# Patient Record
Sex: MALE | Race: WHITE | Employment: OTHER | URBAN - METROPOLITAN AREA
[De-identification: names, ages, dates, MRNs, and addresses within clinical notes are randomized per-mention and may not be internally consistent; named-entity substitution may affect disease eponyms.]

---

## 2017-01-01 ENCOUNTER — HOSPITAL ENCOUNTER (OUTPATIENT)
Dept: NUCLEAR MEDICINE | Facility: HOSPITAL | Age: 72
Discharge: HOME | End: 2017-01-01
Attending: INTERNAL MEDICINE
Payer: MEDICARE

## 2017-01-01 ENCOUNTER — APPOINTMENT (OUTPATIENT)
Dept: CT IMAGING | Facility: HOSPITAL | Age: 72
DRG: 180 | End: 2017-01-01
Attending: EMERGENCY MEDICINE
Payer: MEDICARE

## 2017-01-01 ENCOUNTER — SURGERY (OUTPATIENT)
Age: 72
End: 2017-01-01

## 2017-01-01 ENCOUNTER — OFFICE VISIT (OUTPATIENT)
Dept: HEMATOLOGY/ONCOLOGY | Facility: HOSPITAL | Age: 72
End: 2017-01-01
Attending: PHYSICIAN ASSISTANT
Payer: MEDICARE

## 2017-01-01 ENCOUNTER — TELEPHONE (OUTPATIENT)
Dept: HEMATOLOGY/ONCOLOGY | Facility: HOSPITAL | Age: 72
End: 2017-01-01

## 2017-01-01 ENCOUNTER — HOSPITAL ENCOUNTER (INPATIENT)
Facility: HOSPITAL | Age: 72
LOS: 5 days | Discharge: HOME | DRG: 194 | End: 2017-01-01
Attending: EMERGENCY MEDICINE | Admitting: INTERNAL MEDICINE
Payer: MEDICARE

## 2017-01-01 ENCOUNTER — HOSPITAL ENCOUNTER (INPATIENT)
Facility: HOSPITAL | Age: 72
LOS: 1 days | DRG: 180 | End: 2017-01-01
Attending: INTERNAL MEDICINE | Admitting: INTERNAL MEDICINE
Payer: OTHER MISCELLANEOUS

## 2017-01-01 ENCOUNTER — APPOINTMENT (OUTPATIENT)
Dept: GENERAL RADIOLOGY | Facility: HOSPITAL | Age: 72
DRG: 180 | End: 2017-01-01
Attending: EMERGENCY MEDICINE
Payer: MEDICARE

## 2017-01-01 ENCOUNTER — LAB REQUISITION (OUTPATIENT)
Dept: LAB | Facility: HOSPITAL | Age: 72
End: 2017-01-01
Payer: MEDICARE

## 2017-01-01 ENCOUNTER — APPOINTMENT (OUTPATIENT)
Dept: GENERAL RADIOLOGY | Facility: HOSPITAL | Age: 72
DRG: 194 | End: 2017-01-01
Attending: HOSPITALIST
Payer: MEDICARE

## 2017-01-01 ENCOUNTER — OFFICE VISIT (OUTPATIENT)
Dept: HEMATOLOGY/ONCOLOGY | Facility: HOSPITAL | Age: 72
End: 2017-01-01
Attending: INTERNAL MEDICINE
Payer: MEDICARE

## 2017-01-01 ENCOUNTER — OFFICE VISIT (OUTPATIENT)
Dept: CARDIOLOGY CLINIC | Facility: HOSPITAL | Age: 72
End: 2017-01-01
Attending: INTERNAL MEDICINE
Payer: MEDICARE

## 2017-01-01 ENCOUNTER — TELEPHONE (OUTPATIENT)
Dept: MEDSURG UNIT | Facility: HOSPITAL | Age: 72
End: 2017-01-01

## 2017-01-01 ENCOUNTER — HOSPITAL ENCOUNTER (OUTPATIENT)
Age: 72
Discharge: HOME | End: 2017-01-01
Attending: EMERGENCY MEDICINE
Payer: MEDICARE

## 2017-01-01 ENCOUNTER — HOSPITAL ENCOUNTER (OUTPATIENT)
Dept: INTERVENTIONAL RADIOLOGY/VASCULAR | Facility: HOSPITAL | Age: 72
Discharge: HOME | End: 2017-01-01
Attending: INTERNAL MEDICINE | Admitting: INTERNAL MEDICINE
Payer: MEDICARE

## 2017-01-01 ENCOUNTER — PRIOR ORIGINAL RECORDS (OUTPATIENT)
Dept: OTHER | Age: 72
End: 2017-01-01

## 2017-01-01 ENCOUNTER — NURSE NAVIGATOR ENCOUNTER (OUTPATIENT)
Dept: HEMATOLOGY/ONCOLOGY | Facility: HOSPITAL | Age: 72
End: 2017-01-01

## 2017-01-01 ENCOUNTER — TELEPHONE (OUTPATIENT)
Dept: PALLIATIVE CARE | Facility: HOSPITAL | Age: 72
End: 2017-01-01

## 2017-01-01 ENCOUNTER — HOSPITAL ENCOUNTER (INPATIENT)
Facility: HOSPITAL | Age: 72
LOS: 8 days | Discharge: HOSPICE | DRG: 180 | End: 2017-01-01
Attending: EMERGENCY MEDICINE | Admitting: INTERNAL MEDICINE
Payer: MEDICARE

## 2017-01-01 ENCOUNTER — APPOINTMENT (OUTPATIENT)
Dept: GENERAL RADIOLOGY | Facility: HOSPITAL | Age: 72
DRG: 853 | End: 2017-01-01
Attending: INTERNAL MEDICINE
Payer: MEDICARE

## 2017-01-01 ENCOUNTER — APPOINTMENT (OUTPATIENT)
Dept: GENERAL RADIOLOGY | Facility: HOSPITAL | Age: 72
DRG: 180 | End: 2017-01-01
Attending: INTERNAL MEDICINE
Payer: MEDICARE

## 2017-01-01 ENCOUNTER — APPOINTMENT (OUTPATIENT)
Dept: INTERVENTIONAL RADIOLOGY/VASCULAR | Facility: HOSPITAL | Age: 72
DRG: 180 | End: 2017-01-01
Attending: INTERNAL MEDICINE
Payer: MEDICARE

## 2017-01-01 ENCOUNTER — APPOINTMENT (OUTPATIENT)
Dept: ULTRASOUND IMAGING | Facility: HOSPITAL | Age: 72
DRG: 180 | End: 2017-01-01
Attending: INTERNAL MEDICINE
Payer: MEDICARE

## 2017-01-01 ENCOUNTER — HOSPITAL ENCOUNTER (INPATIENT)
Facility: HOSPITAL | Age: 72
LOS: 6 days | Discharge: HOME HEALTH | DRG: 180 | End: 2017-01-01
Attending: EMERGENCY MEDICINE | Admitting: INTERNAL MEDICINE
Payer: MEDICARE

## 2017-01-01 ENCOUNTER — HOSPITAL ENCOUNTER (INPATIENT)
Facility: HOSPITAL | Age: 72
LOS: 16 days | Discharge: HOME HEALTH | DRG: 853 | End: 2017-01-01
Attending: EMERGENCY MEDICINE | Admitting: INTERNAL MEDICINE
Payer: MEDICARE

## 2017-01-01 ENCOUNTER — APPOINTMENT (OUTPATIENT)
Dept: MRI IMAGING | Facility: HOSPITAL | Age: 72
DRG: 180 | End: 2017-01-01
Attending: INTERNAL MEDICINE
Payer: MEDICARE

## 2017-01-01 ENCOUNTER — APPOINTMENT (OUTPATIENT)
Dept: CT IMAGING | Facility: HOSPITAL | Age: 72
DRG: 853 | End: 2017-01-01
Attending: INTERNAL MEDICINE
Payer: MEDICARE

## 2017-01-01 ENCOUNTER — TELEPHONE (OUTPATIENT)
Dept: CARDIOLOGY UNIT | Facility: HOSPITAL | Age: 72
End: 2017-01-01

## 2017-01-01 ENCOUNTER — HOSPITAL ENCOUNTER (OUTPATIENT)
Age: 72
Discharge: ANOTHER HOSPITAL | DRG: 180 | End: 2017-01-01
Attending: EMERGENCY MEDICINE
Payer: MEDICARE

## 2017-01-01 ENCOUNTER — SOCIAL WORK SERVICES (OUTPATIENT)
Dept: HEMATOLOGY/ONCOLOGY | Facility: HOSPITAL | Age: 72
End: 2017-01-01

## 2017-01-01 ENCOUNTER — HOSPITAL ENCOUNTER (OUTPATIENT)
Dept: GENERAL RADIOLOGY | Facility: HOSPITAL | Age: 72
Discharge: HOME | End: 2017-01-01
Attending: INTERNAL MEDICINE
Payer: MEDICARE

## 2017-01-01 ENCOUNTER — APPOINTMENT (OUTPATIENT)
Dept: GENERAL RADIOLOGY | Age: 72
DRG: 180 | End: 2017-01-01
Attending: EMERGENCY MEDICINE
Payer: MEDICARE

## 2017-01-01 ENCOUNTER — APPOINTMENT (OUTPATIENT)
Dept: CV DIAGNOSTICS | Facility: HOSPITAL | Age: 72
DRG: 194 | End: 2017-01-01
Attending: INTERNAL MEDICINE
Payer: MEDICARE

## 2017-01-01 ENCOUNTER — APPOINTMENT (OUTPATIENT)
Dept: CT IMAGING | Facility: HOSPITAL | Age: 72
DRG: 180 | End: 2017-01-01
Attending: INTERNAL MEDICINE
Payer: MEDICARE

## 2017-01-01 ENCOUNTER — APPOINTMENT (OUTPATIENT)
Dept: GENERAL RADIOLOGY | Facility: HOSPITAL | Age: 72
DRG: 853 | End: 2017-01-01
Attending: EMERGENCY MEDICINE
Payer: MEDICARE

## 2017-01-01 ENCOUNTER — APPOINTMENT (OUTPATIENT)
Dept: GENERAL RADIOLOGY | Facility: HOSPITAL | Age: 72
DRG: 180 | End: 2017-01-01
Attending: CLINICAL NURSE SPECIALIST
Payer: MEDICARE

## 2017-01-01 ENCOUNTER — APPOINTMENT (OUTPATIENT)
Dept: CV DIAGNOSTICS | Facility: HOSPITAL | Age: 72
DRG: 180 | End: 2017-01-01
Attending: INTERNAL MEDICINE
Payer: MEDICARE

## 2017-01-01 ENCOUNTER — APPOINTMENT (OUTPATIENT)
Dept: GENERAL RADIOLOGY | Facility: HOSPITAL | Age: 72
DRG: 194 | End: 2017-01-01
Payer: MEDICARE

## 2017-01-01 VITALS
HEART RATE: 47 BPM | RESPIRATION RATE: 32 BRPM | DIASTOLIC BLOOD PRESSURE: 45 MMHG | SYSTOLIC BLOOD PRESSURE: 77 MMHG | OXYGEN SATURATION: 90 %

## 2017-01-01 VITALS
TEMPERATURE: 98 F | WEIGHT: 164 LBS | DIASTOLIC BLOOD PRESSURE: 65 MMHG | BODY MASS INDEX: 22.21 KG/M2 | HEART RATE: 91 BPM | HEIGHT: 72 IN | SYSTOLIC BLOOD PRESSURE: 112 MMHG | RESPIRATION RATE: 18 BRPM

## 2017-01-01 VITALS
HEART RATE: 84 BPM | DIASTOLIC BLOOD PRESSURE: 67 MMHG | SYSTOLIC BLOOD PRESSURE: 104 MMHG | RESPIRATION RATE: 18 BRPM | TEMPERATURE: 98 F

## 2017-01-01 VITALS
HEIGHT: 72 IN | TEMPERATURE: 98 F | WEIGHT: 154.38 LBS | RESPIRATION RATE: 18 BRPM | SYSTOLIC BLOOD PRESSURE: 100 MMHG | DIASTOLIC BLOOD PRESSURE: 69 MMHG | BODY MASS INDEX: 20.91 KG/M2 | HEART RATE: 94 BPM

## 2017-01-01 VITALS
HEIGHT: 72 IN | TEMPERATURE: 98 F | SYSTOLIC BLOOD PRESSURE: 116 MMHG | HEART RATE: 79 BPM | OXYGEN SATURATION: 94 % | DIASTOLIC BLOOD PRESSURE: 68 MMHG | BODY MASS INDEX: 22.58 KG/M2 | WEIGHT: 166.69 LBS | RESPIRATION RATE: 18 BRPM

## 2017-01-01 VITALS
HEIGHT: 71 IN | WEIGHT: 165 LBS | OXYGEN SATURATION: 93 % | RESPIRATION RATE: 19 BRPM | BODY MASS INDEX: 23.1 KG/M2 | SYSTOLIC BLOOD PRESSURE: 90 MMHG | HEART RATE: 84 BPM | DIASTOLIC BLOOD PRESSURE: 57 MMHG

## 2017-01-01 VITALS
HEART RATE: 91 BPM | OXYGEN SATURATION: 90 % | TEMPERATURE: 99 F | WEIGHT: 155.5 LBS | DIASTOLIC BLOOD PRESSURE: 77 MMHG | SYSTOLIC BLOOD PRESSURE: 127 MMHG | BODY MASS INDEX: 21 KG/M2

## 2017-01-01 VITALS
BODY MASS INDEX: 21.4 KG/M2 | SYSTOLIC BLOOD PRESSURE: 102 MMHG | TEMPERATURE: 98 F | HEART RATE: 75 BPM | RESPIRATION RATE: 18 BRPM | HEIGHT: 72 IN | WEIGHT: 158 LBS | DIASTOLIC BLOOD PRESSURE: 59 MMHG

## 2017-01-01 VITALS
WEIGHT: 151 LBS | BODY MASS INDEX: 21.14 KG/M2 | HEIGHT: 71 IN | RESPIRATION RATE: 22 BRPM | HEART RATE: 58 BPM | SYSTOLIC BLOOD PRESSURE: 94 MMHG | DIASTOLIC BLOOD PRESSURE: 54 MMHG | OXYGEN SATURATION: 91 % | TEMPERATURE: 98 F

## 2017-01-01 VITALS
HEART RATE: 77 BPM | SYSTOLIC BLOOD PRESSURE: 116 MMHG | HEIGHT: 71 IN | RESPIRATION RATE: 16 BRPM | BODY MASS INDEX: 25.2 KG/M2 | OXYGEN SATURATION: 96 % | DIASTOLIC BLOOD PRESSURE: 76 MMHG | WEIGHT: 180 LBS | TEMPERATURE: 98 F

## 2017-01-01 VITALS
WEIGHT: 139.38 LBS | HEIGHT: 71 IN | SYSTOLIC BLOOD PRESSURE: 100 MMHG | TEMPERATURE: 98 F | BODY MASS INDEX: 19.51 KG/M2 | RESPIRATION RATE: 16 BRPM | HEART RATE: 99 BPM | OXYGEN SATURATION: 96 % | DIASTOLIC BLOOD PRESSURE: 68 MMHG

## 2017-01-01 VITALS
HEART RATE: 84 BPM | WEIGHT: 155 LBS | DIASTOLIC BLOOD PRESSURE: 58 MMHG | SYSTOLIC BLOOD PRESSURE: 106 MMHG | BODY MASS INDEX: 21 KG/M2 | OXYGEN SATURATION: 91 %

## 2017-01-01 VITALS
HEIGHT: 72 IN | SYSTOLIC BLOOD PRESSURE: 111 MMHG | RESPIRATION RATE: 16 BRPM | WEIGHT: 175 LBS | DIASTOLIC BLOOD PRESSURE: 69 MMHG | OXYGEN SATURATION: 94 % | TEMPERATURE: 98 F | HEART RATE: 98 BPM | BODY MASS INDEX: 23.7 KG/M2

## 2017-01-01 VITALS
RESPIRATION RATE: 18 BRPM | HEART RATE: 98 BPM | DIASTOLIC BLOOD PRESSURE: 59 MMHG | SYSTOLIC BLOOD PRESSURE: 104 MMHG | TEMPERATURE: 98 F

## 2017-01-01 VITALS
WEIGHT: 163.5 LBS | DIASTOLIC BLOOD PRESSURE: 73 MMHG | TEMPERATURE: 99 F | HEART RATE: 73 BPM | OXYGEN SATURATION: 95 % | RESPIRATION RATE: 24 BRPM | BODY MASS INDEX: 22.14 KG/M2 | HEIGHT: 72 IN | SYSTOLIC BLOOD PRESSURE: 120 MMHG

## 2017-01-01 VITALS
SYSTOLIC BLOOD PRESSURE: 116 MMHG | DIASTOLIC BLOOD PRESSURE: 68 MMHG | HEART RATE: 85 BPM | RESPIRATION RATE: 18 BRPM | TEMPERATURE: 99 F

## 2017-01-01 DIAGNOSIS — C79.51 BONE METASTASIS (HCC): Primary | ICD-10-CM

## 2017-01-01 DIAGNOSIS — J90 PLEURAL EFFUSION: ICD-10-CM

## 2017-01-01 DIAGNOSIS — J91.0 MALIGNANT PLEURAL EFFUSION: ICD-10-CM

## 2017-01-01 DIAGNOSIS — M79.18 INTERCOSTAL MUSCLE PAIN: Primary | ICD-10-CM

## 2017-01-01 DIAGNOSIS — C34.90 NON-SMALL CELL LUNG CANCER, UNSPECIFIED LATERALITY (HCC): ICD-10-CM

## 2017-01-01 DIAGNOSIS — C34.90 NON-SMALL CELL LUNG CANCER, UNSPECIFIED LATERALITY (HCC): Primary | ICD-10-CM

## 2017-01-01 DIAGNOSIS — R06.00 DYSPNEA, UNSPECIFIED TYPE: Primary | ICD-10-CM

## 2017-01-01 DIAGNOSIS — J18.9 COMMUNITY ACQUIRED PNEUMONIA, UNSPECIFIED LATERALITY: ICD-10-CM

## 2017-01-01 DIAGNOSIS — C34.92 NON-SMALL CELL CANCER OF LEFT LUNG (HCC): ICD-10-CM

## 2017-01-01 DIAGNOSIS — K59.09 OTHER CONSTIPATION: ICD-10-CM

## 2017-01-01 DIAGNOSIS — Z79.899 ON AMIODARONE THERAPY: ICD-10-CM

## 2017-01-01 DIAGNOSIS — Z85.21 HISTORY OF LARYNGEAL CANCER: ICD-10-CM

## 2017-01-01 DIAGNOSIS — R09.02 HYPOXIA: ICD-10-CM

## 2017-01-01 DIAGNOSIS — J18.9 COMMUNITY ACQUIRED PNEUMONIA OF LEFT LUNG, UNSPECIFIED PART OF LUNG: ICD-10-CM

## 2017-01-01 DIAGNOSIS — R11.0 NAUSEA: ICD-10-CM

## 2017-01-01 DIAGNOSIS — I48.0 PAROXYSMAL ATRIAL FIBRILLATION (HCC): ICD-10-CM

## 2017-01-01 DIAGNOSIS — F40.240 CLAUSTROPHOBIA: ICD-10-CM

## 2017-01-01 DIAGNOSIS — J44.1 COPD EXACERBATION (HCC): ICD-10-CM

## 2017-01-01 DIAGNOSIS — F32.89 OTHER DEPRESSION: ICD-10-CM

## 2017-01-01 DIAGNOSIS — I48.0 PAF (PAROXYSMAL ATRIAL FIBRILLATION) (HCC): ICD-10-CM

## 2017-01-01 DIAGNOSIS — C79.51 BONE METASTASIS (HCC): ICD-10-CM

## 2017-01-01 DIAGNOSIS — R91.8 LUNG MASS: ICD-10-CM

## 2017-01-01 DIAGNOSIS — J93.9 PNEUMOTHORAX, UNSPECIFIED TYPE: ICD-10-CM

## 2017-01-01 DIAGNOSIS — R79.89 ABNORMAL TSH: ICD-10-CM

## 2017-01-01 DIAGNOSIS — C34.90 LUNG CANCER (HCC): ICD-10-CM

## 2017-01-01 DIAGNOSIS — R63.4 WEIGHT LOSS: ICD-10-CM

## 2017-01-01 DIAGNOSIS — I48.92 ATRIAL FLUTTER WITH RAPID VENTRICULAR RESPONSE (HCC): ICD-10-CM

## 2017-01-01 DIAGNOSIS — A41.9 SEPSIS, DUE TO UNSPECIFIED ORGANISM: ICD-10-CM

## 2017-01-01 DIAGNOSIS — I48.91 ATRIAL FIBRILLATION (HCC): Primary | ICD-10-CM

## 2017-01-01 DIAGNOSIS — A41.9 SEPSIS, DUE TO UNSPECIFIED ORGANISM: Primary | ICD-10-CM

## 2017-01-01 DIAGNOSIS — R06.00 DYSPNEA, UNSPECIFIED TYPE: ICD-10-CM

## 2017-01-01 DIAGNOSIS — J90 PLEURAL EFFUSION: Primary | ICD-10-CM

## 2017-01-01 DIAGNOSIS — Z85.22 HISTORY OF CANCER OF NASAL CAVITY: ICD-10-CM

## 2017-01-01 DIAGNOSIS — J90 PLEURAL EFFUSION, LEFT: Primary | ICD-10-CM

## 2017-01-01 DIAGNOSIS — D72.829 LEUKOCYTOSIS, UNSPECIFIED TYPE: ICD-10-CM

## 2017-01-01 DIAGNOSIS — Z01.89 ENCOUNTER FOR OTHER SPECIFIED SPECIAL EXAMINATIONS: ICD-10-CM

## 2017-01-01 DIAGNOSIS — R11.2 NAUSEA AND VOMITING, INTRACTABILITY OF VOMITING NOT SPECIFIED, UNSPECIFIED VOMITING TYPE: ICD-10-CM

## 2017-01-01 LAB
ALBUMIN SERPL BCP-MCNC: 2 G/DL (ref 3.5–4.8)
ALBUMIN SERPL BCP-MCNC: 2 G/DL (ref 3.5–4.8)
ALBUMIN SERPL BCP-MCNC: 3.3 G/DL (ref 3.5–4.8)
ALBUMIN/GLOB SERPL: 0.6 {RATIO} (ref 1–2)
ALBUMIN/GLOB SERPL: 0.6 {RATIO} (ref 1–2)
ALBUMIN/GLOB SERPL: 1.1 {RATIO} (ref 1–2)
ALP SERPL-CCNC: 108 U/L (ref 32–100)
ALP SERPL-CCNC: 74 U/L (ref 32–100)
ALP SERPL-CCNC: 95 U/L (ref 32–100)
ALT SERPL-CCNC: 16 U/L (ref 17–63)
ALT SERPL-CCNC: 18 U/L (ref 17–63)
ALT SERPL-CCNC: 18 U/L (ref 17–63)
ANION GAP SERPL CALC-SCNC: 10 MMOL/L (ref 0–18)
ANION GAP SERPL CALC-SCNC: 11 MMOL/L (ref 0–18)
ANION GAP SERPL CALC-SCNC: 13 MMOL/L (ref 0–18)
ANION GAP SERPL CALC-SCNC: 5 MMOL/L (ref 0–18)
ANION GAP SERPL CALC-SCNC: 6 MMOL/L (ref 0–18)
ANION GAP SERPL CALC-SCNC: 7 MMOL/L (ref 0–18)
ANION GAP SERPL CALC-SCNC: 8 MMOL/L (ref 0–18)
ANION GAP SERPL CALC-SCNC: 9 MMOL/L (ref 0–18)
APTT PPP: 31.5 SECONDS (ref 23.2–35.3)
AST SERPL-CCNC: 32 U/L (ref 15–41)
AST SERPL-CCNC: 37 U/L (ref 15–41)
AST SERPL-CCNC: 37 U/L (ref 15–41)
BASOPHILS # BLD: 0 K/UL (ref 0–0.2)
BASOPHILS # BLD: 0 K/UL (ref 0–0.2)
BASOPHILS # BLD: 0.1 K/UL (ref 0–0.2)
BASOPHILS NFR BLD: 0 %
BASOPHILS NFR BLD: 0 %
BASOPHILS NFR BLD: 1 %
BASOPHILS NFR FLD: 0 %
BASOPHILS NFR FLD: 0 %
BILIRUB SERPL-MCNC: 0.5 MG/DL (ref 0.3–1.2)
BILIRUB SERPL-MCNC: 0.8 MG/DL (ref 0.3–1.2)
BILIRUB SERPL-MCNC: 1 MG/DL (ref 0.3–1.2)
BUN SERPL-MCNC: 10 MG/DL (ref 8–20)
BUN SERPL-MCNC: 14 MG/DL (ref 8–20)
BUN SERPL-MCNC: 16 MG/DL (ref 8–20)
BUN SERPL-MCNC: 16 MG/DL (ref 8–20)
BUN SERPL-MCNC: 25 MG/DL (ref 8–20)
BUN SERPL-MCNC: 31 MG/DL (ref 8–20)
BUN SERPL-MCNC: 40 MG/DL (ref 8–20)
BUN SERPL-MCNC: 9 MG/DL (ref 8–20)
BUN SERPL-MCNC: 9 MG/DL (ref 8–20)
BUN/CREAT SERPL: 14.7 (ref 10–20)
BUN/CREAT SERPL: 16.1 (ref 10–20)
BUN/CREAT SERPL: 16.7 (ref 10–20)
BUN/CREAT SERPL: 16.8 (ref 10–20)
BUN/CREAT SERPL: 18.5 (ref 10–20)
BUN/CREAT SERPL: 20 (ref 10–20)
BUN/CREAT SERPL: 20.8 (ref 10–20)
BUN/CREAT SERPL: 22.3 (ref 10–20)
BUN/CREAT SERPL: 8.5 (ref 10–20)
BUN/CREAT SERPL: 8.7 (ref 10–20)
BUN/CREAT SERPL: 9.5 (ref 10–20)
C DIFF TOX B STL QL: NEGATIVE
CALCIUM SERPL-MCNC: 7.9 MG/DL (ref 8.5–10.5)
CALCIUM SERPL-MCNC: 7.9 MG/DL (ref 8.5–10.5)
CALCIUM SERPL-MCNC: 8.4 MG/DL (ref 8.5–10.5)
CALCIUM SERPL-MCNC: 8.5 MG/DL (ref 8.5–10.5)
CALCIUM SERPL-MCNC: 9 MG/DL (ref 8.5–10.5)
CALCIUM SERPL-MCNC: 9.1 MG/DL (ref 8.5–10.5)
CALCIUM SERPL-MCNC: 9.4 MG/DL (ref 8.5–10.5)
CALCIUM SERPL-MCNC: 9.5 MG/DL (ref 8.5–10.5)
CHLORIDE SERPL-SCNC: 103 MMOL/L (ref 95–110)
CHLORIDE SERPL-SCNC: 103 MMOL/L (ref 95–110)
CHLORIDE SERPL-SCNC: 105 MMOL/L (ref 95–110)
CHLORIDE SERPL-SCNC: 105 MMOL/L (ref 95–110)
CHLORIDE SERPL-SCNC: 106 MMOL/L (ref 95–110)
CHLORIDE SERPL-SCNC: 106 MMOL/L (ref 95–110)
CHLORIDE SERPL-SCNC: 107 MMOL/L (ref 95–110)
CHLORIDE SERPL-SCNC: 108 MMOL/L (ref 95–110)
CHLORIDE SERPL-SCNC: 108 MMOL/L (ref 95–110)
CO2 SERPL-SCNC: 20 MMOL/L (ref 22–32)
CO2 SERPL-SCNC: 23 MMOL/L (ref 22–32)
CO2 SERPL-SCNC: 24 MMOL/L (ref 22–32)
CO2 SERPL-SCNC: 25 MMOL/L (ref 22–32)
COLOR FLD: YELLOW
COLOR FLD: YELLOW
CREAT SERPL-MCNC: 0.87 MG/DL (ref 0.5–1.5)
CREAT SERPL-MCNC: 0.95 MG/DL (ref 0.5–1.5)
CREAT SERPL-MCNC: 1.03 MG/DL (ref 0.5–1.5)
CREAT SERPL-MCNC: 1.05 MG/DL (ref 0.5–1.5)
CREAT SERPL-MCNC: 1.06 MG/DL (ref 0.5–1.5)
CREAT SERPL-MCNC: 1.09 MG/DL (ref 0.5–1.5)
CREAT SERPL-MCNC: 1.25 MG/DL (ref 0.5–1.5)
CREAT SERPL-MCNC: 1.35 MG/DL (ref 0.5–1.5)
CREAT SERPL-MCNC: 1.49 MG/DL (ref 0.5–1.5)
CREAT SERPL-MCNC: 1.5 MG/DL (ref 0.5–1.5)
CREAT SERPL-MCNC: 1.79 MG/DL (ref 0.5–1.5)
EOSINOPHIL # BLD: 0 K/UL (ref 0–0.7)
EOSINOPHIL # BLD: 0.1 K/UL (ref 0–0.7)
EOSINOPHIL # BLD: 0.2 K/UL (ref 0–0.7)
EOSINOPHIL # BLD: 0.4 K/UL (ref 0–0.7)
EOSINOPHIL # BLD: 0.6 K/UL (ref 0–0.7)
EOSINOPHIL # BLD: 0.8 K/UL (ref 0–0.7)
EOSINOPHIL NFR BLD: 0 %
EOSINOPHIL NFR BLD: 0 %
EOSINOPHIL NFR BLD: 2 %
EOSINOPHIL NFR BLD: 6 %
EOSINOPHIL NFR BLD: 6 %
EOSINOPHIL NFR BLD: 7 %
EOSINOPHIL NFR FLD: 0 %
EOSINOPHIL NFR FLD: 0 %
ERYTHROCYTE [DISTWIDTH] IN BLOOD BY AUTOMATED COUNT: 14.5 % (ref 11–15)
ERYTHROCYTE [DISTWIDTH] IN BLOOD BY AUTOMATED COUNT: 14.5 % (ref 11–15)
ERYTHROCYTE [DISTWIDTH] IN BLOOD BY AUTOMATED COUNT: 14.6 % (ref 11–15)
ERYTHROCYTE [DISTWIDTH] IN BLOOD BY AUTOMATED COUNT: 14.7 % (ref 11–15)
ERYTHROCYTE [DISTWIDTH] IN BLOOD BY AUTOMATED COUNT: 14.8 % (ref 11–15)
ERYTHROCYTE [DISTWIDTH] IN BLOOD BY AUTOMATED COUNT: 17.4 % (ref 11–15)
ERYTHROCYTE [DISTWIDTH] IN BLOOD BY AUTOMATED COUNT: 17.9 % (ref 11–15)
GLOBULIN PLAS-MCNC: 3 G/DL (ref 2.5–3.7)
GLOBULIN PLAS-MCNC: 3.3 G/DL (ref 2.5–3.7)
GLOBULIN PLAS-MCNC: 3.5 G/DL (ref 2.5–3.7)
GLUCOSE BLDC GLUCOMTR-MCNC: 103 MG/DL (ref 70–99)
GLUCOSE BLDC GLUCOMTR-MCNC: 104 MG/DL (ref 70–99)
GLUCOSE BLDC GLUCOMTR-MCNC: 106 MG/DL (ref 70–99)
GLUCOSE BLDC GLUCOMTR-MCNC: 107 MG/DL (ref 70–99)
GLUCOSE BLDC GLUCOMTR-MCNC: 107 MG/DL (ref 70–99)
GLUCOSE BLDC GLUCOMTR-MCNC: 109 MG/DL (ref 70–99)
GLUCOSE BLDC GLUCOMTR-MCNC: 111 MG/DL (ref 70–99)
GLUCOSE BLDC GLUCOMTR-MCNC: 115 MG/DL (ref 70–99)
GLUCOSE BLDC GLUCOMTR-MCNC: 118 MG/DL (ref 70–99)
GLUCOSE BLDC GLUCOMTR-MCNC: 119 MG/DL (ref 70–99)
GLUCOSE BLDC GLUCOMTR-MCNC: 119 MG/DL (ref 70–99)
GLUCOSE BLDC GLUCOMTR-MCNC: 122 MG/DL (ref 70–99)
GLUCOSE BLDC GLUCOMTR-MCNC: 124 MG/DL (ref 70–99)
GLUCOSE BLDC GLUCOMTR-MCNC: 127 MG/DL (ref 70–99)
GLUCOSE BLDC GLUCOMTR-MCNC: 127 MG/DL (ref 70–99)
GLUCOSE BLDC GLUCOMTR-MCNC: 130 MG/DL (ref 70–99)
GLUCOSE BLDC GLUCOMTR-MCNC: 131 MG/DL (ref 70–99)
GLUCOSE BLDC GLUCOMTR-MCNC: 135 MG/DL (ref 70–99)
GLUCOSE BLDC GLUCOMTR-MCNC: 136 MG/DL (ref 70–99)
GLUCOSE BLDC GLUCOMTR-MCNC: 137 MG/DL (ref 70–99)
GLUCOSE BLDC GLUCOMTR-MCNC: 138 MG/DL (ref 70–99)
GLUCOSE BLDC GLUCOMTR-MCNC: 140 MG/DL (ref 70–99)
GLUCOSE BLDC GLUCOMTR-MCNC: 146 MG/DL (ref 70–99)
GLUCOSE BLDC GLUCOMTR-MCNC: 154 MG/DL (ref 70–99)
GLUCOSE BLDC GLUCOMTR-MCNC: 158 MG/DL (ref 70–99)
GLUCOSE BLDC GLUCOMTR-MCNC: 159 MG/DL (ref 70–99)
GLUCOSE BLDC GLUCOMTR-MCNC: 160 MG/DL (ref 70–99)
GLUCOSE BLDC GLUCOMTR-MCNC: 161 MG/DL (ref 70–99)
GLUCOSE BLDC GLUCOMTR-MCNC: 163 MG/DL (ref 70–99)
GLUCOSE BLDC GLUCOMTR-MCNC: 170 MG/DL (ref 70–99)
GLUCOSE BLDC GLUCOMTR-MCNC: 171 MG/DL (ref 70–99)
GLUCOSE BLDC GLUCOMTR-MCNC: 185 MG/DL (ref 70–99)
GLUCOSE BLDC GLUCOMTR-MCNC: 186 MG/DL (ref 70–99)
GLUCOSE BLDC GLUCOMTR-MCNC: 186 MG/DL (ref 70–99)
GLUCOSE BLDC GLUCOMTR-MCNC: 192 MG/DL (ref 70–99)
GLUCOSE BLDC GLUCOMTR-MCNC: 194 MG/DL (ref 70–99)
GLUCOSE BLDC GLUCOMTR-MCNC: 195 MG/DL (ref 70–99)
GLUCOSE BLDC GLUCOMTR-MCNC: 199 MG/DL (ref 70–99)
GLUCOSE BLDC GLUCOMTR-MCNC: 200 MG/DL (ref 70–99)
GLUCOSE BLDC GLUCOMTR-MCNC: 216 MG/DL (ref 70–99)
GLUCOSE BLDC GLUCOMTR-MCNC: 224 MG/DL (ref 70–99)
GLUCOSE BLDC GLUCOMTR-MCNC: 235 MG/DL (ref 70–99)
GLUCOSE BLDC GLUCOMTR-MCNC: 246 MG/DL (ref 70–99)
GLUCOSE BLDC GLUCOMTR-MCNC: 263 MG/DL (ref 70–99)
GLUCOSE BLDC GLUCOMTR-MCNC: 292 MG/DL (ref 70–99)
GLUCOSE BLDC GLUCOMTR-MCNC: 295 MG/DL (ref 70–99)
GLUCOSE BLDC GLUCOMTR-MCNC: 93 MG/DL (ref 70–99)
GLUCOSE BLDC GLUCOMTR-MCNC: 95 MG/DL (ref 70–99)
GLUCOSE BLDC GLUCOMTR-MCNC: 96 MG/DL (ref 70–99)
GLUCOSE PLEURAL FLUID: 132 MG/DL
GLUCOSE PLEURAL FLUID: 80 MG/DL
GLUCOSE SERPL-MCNC: 111 MG/DL (ref 70–99)
GLUCOSE SERPL-MCNC: 121 MG/DL (ref 70–99)
GLUCOSE SERPL-MCNC: 127 MG/DL (ref 70–99)
GLUCOSE SERPL-MCNC: 133 MG/DL (ref 70–99)
GLUCOSE SERPL-MCNC: 142 MG/DL (ref 70–99)
GLUCOSE SERPL-MCNC: 151 MG/DL (ref 70–99)
GLUCOSE SERPL-MCNC: 189 MG/DL (ref 70–99)
GLUCOSE SERPL-MCNC: 213 MG/DL (ref 70–99)
GLUCOSE SERPL-MCNC: 218 MG/DL (ref 70–99)
GLUCOSE SERPL-MCNC: 221 MG/DL (ref 70–99)
GLUCOSE SERPL-MCNC: 98 MG/DL (ref 70–99)
HCT VFR BLD AUTO: 28.6 % (ref 41–52)
HCT VFR BLD AUTO: 28.6 % (ref 41–52)
HCT VFR BLD AUTO: 36.8 % (ref 41–52)
HCT VFR BLD AUTO: 37 % (ref 41–52)
HCT VFR BLD AUTO: 38.7 % (ref 41–52)
HCT VFR BLD AUTO: 40 % (ref 41–52)
HCT VFR BLD AUTO: 40.1 % (ref 41–52)
HGB BLD-MCNC: 12.3 G/DL (ref 13.5–17.5)
HGB BLD-MCNC: 12.5 G/DL (ref 13.5–17.5)
HGB BLD-MCNC: 12.9 G/DL (ref 13.5–17.5)
HGB BLD-MCNC: 13.3 G/DL (ref 13.5–17.5)
HGB BLD-MCNC: 13.5 G/DL (ref 13.5–17.5)
HGB BLD-MCNC: 8.9 G/DL (ref 13.5–17.5)
HGB BLD-MCNC: 9 G/DL (ref 13.5–17.5)
INR BLD: 1.1 (ref 0.9–1.2)
LACTATE SERPL-SCNC: 1.2 MMOL/L (ref 0.5–2.2)
LACTATE SERPL-SCNC: 1.6 MMOL/L (ref 0.5–2.2)
LACTATE SERPL-SCNC: 1.8 MMOL/L (ref 0.5–2.2)
LACTATE SERPL-SCNC: 3.8 MMOL/L (ref 0.5–2.2)
LACTATE SERPL-SCNC: 4.5 MMOL/L (ref 0.5–2.2)
LDH PLEURAL FLUID: 293 U/L
LDH PLEURAL FLUID: 581 U/L
LYMPHOCYTES # BLD: 0.5 K/UL (ref 1–4)
LYMPHOCYTES # BLD: 0.7 K/UL (ref 1–4)
LYMPHOCYTES # BLD: 1.3 K/UL (ref 1–4)
LYMPHOCYTES # BLD: 1.5 K/UL (ref 1–4)
LYMPHOCYTES # BLD: 1.6 K/UL (ref 1–4)
LYMPHOCYTES # BLD: 1.7 K/UL (ref 1–4)
LYMPHOCYTES NFR BLD: 13 %
LYMPHOCYTES NFR BLD: 16 %
LYMPHOCYTES NFR BLD: 16 %
LYMPHOCYTES NFR BLD: 17 %
LYMPHOCYTES NFR BLD: 3 %
LYMPHOCYTES NFR BLD: 4 %
LYMPHOCYTES NFR FLD: 16 %
LYMPHOCYTES NFR FLD: 44 %
MCH RBC QN AUTO: 24.8 PG (ref 27–32)
MCH RBC QN AUTO: 25.2 PG (ref 27–32)
MCH RBC QN AUTO: 29.3 PG (ref 27–32)
MCH RBC QN AUTO: 30.4 PG (ref 27–32)
MCH RBC QN AUTO: 30.9 PG (ref 27–32)
MCHC RBC AUTO-ENTMCNC: 31.1 G/DL (ref 32–37)
MCHC RBC AUTO-ENTMCNC: 31.6 G/DL (ref 32–37)
MCHC RBC AUTO-ENTMCNC: 33.3 G/DL (ref 32–37)
MCHC RBC AUTO-ENTMCNC: 33.4 G/DL (ref 32–37)
MCHC RBC AUTO-ENTMCNC: 33.4 G/DL (ref 32–37)
MCHC RBC AUTO-ENTMCNC: 33.7 G/DL (ref 32–37)
MCHC RBC AUTO-ENTMCNC: 34.1 G/DL (ref 32–37)
MCV RBC AUTO: 79.8 FL (ref 80–100)
MCV RBC AUTO: 79.9 FL (ref 80–100)
MCV RBC AUTO: 87.8 FL (ref 80–100)
MCV RBC AUTO: 90.1 FL (ref 80–100)
MCV RBC AUTO: 90.7 FL (ref 80–100)
MCV RBC AUTO: 90.9 FL (ref 80–100)
MCV RBC AUTO: 91.1 FL (ref 80–100)
MONOCYTES # BLD: 0.6 K/UL (ref 0–1)
MONOCYTES # BLD: 0.7 K/UL (ref 0–1)
MONOCYTES # BLD: 0.8 K/UL (ref 0–1)
MONOCYTES # BLD: 0.9 K/UL (ref 0–1)
MONOCYTES NFR BLD: 5 %
MONOCYTES NFR BLD: 5 %
MONOCYTES NFR BLD: 6 %
MONOCYTES NFR BLD: 8 %
MONOCYTES NFR FLD: 20 %
MONOCYTES NFR FLD: 76 %
NEUTROPHILS # BLD AUTO: 15 K/UL (ref 1.8–7.7)
NEUTROPHILS # BLD AUTO: 15 K/UL (ref 1.8–7.7)
NEUTROPHILS # BLD AUTO: 5.5 K/UL (ref 1.8–7.7)
NEUTROPHILS # BLD AUTO: 6.8 K/UL (ref 1.8–7.7)
NEUTROPHILS # BLD AUTO: 7.8 K/UL (ref 1.8–7.7)
NEUTROPHILS # BLD AUTO: 9 K/UL (ref 1.8–7.7)
NEUTROPHILS NFR BLD: 69 %
NEUTROPHILS NFR BLD: 70 %
NEUTROPHILS NFR BLD: 73 %
NEUTROPHILS NFR BLD: 74 %
NEUTROPHILS NFR BLD: 90 %
NEUTROPHILS NFR BLD: 91 %
NEUTROPHILS NFR FLD: 36 %
NEUTROPHILS NFR FLD: 8 %
OSMOLALITY UR CALC.SUM OF ELEC: 288 MOSM/KG (ref 275–295)
OSMOLALITY UR CALC.SUM OF ELEC: 289 MOSM/KG (ref 275–295)
OSMOLALITY UR CALC.SUM OF ELEC: 290 MOSM/KG (ref 275–295)
OSMOLALITY UR CALC.SUM OF ELEC: 291 MOSM/KG (ref 275–295)
OSMOLALITY UR CALC.SUM OF ELEC: 292 MOSM/KG (ref 275–295)
OSMOLALITY UR CALC.SUM OF ELEC: 293 MOSM/KG (ref 275–295)
OSMOLALITY UR CALC.SUM OF ELEC: 293 MOSM/KG (ref 275–295)
OSMOLALITY UR CALC.SUM OF ELEC: 295 MOSM/KG (ref 275–295)
OSMOLALITY UR CALC.SUM OF ELEC: 301 MOSM/KG (ref 275–295)
PLATELET # BLD AUTO: 277 K/UL (ref 140–400)
PLATELET # BLD AUTO: 290 K/UL (ref 140–400)
PLATELET # BLD AUTO: 312 K/UL (ref 140–400)
PLATELET # BLD AUTO: 335 K/UL (ref 140–400)
PLATELET # BLD AUTO: 362 K/UL (ref 140–400)
PLATELET # BLD AUTO: 366 K/UL (ref 140–400)
PLATELET # BLD AUTO: 411 K/UL (ref 140–400)
PMV BLD AUTO: 7.6 FL (ref 7.4–10.3)
PMV BLD AUTO: 7.7 FL (ref 7.4–10.3)
PMV BLD AUTO: 8.5 FL (ref 7.4–10.3)
PMV BLD AUTO: 8.6 FL (ref 7.4–10.3)
PMV BLD AUTO: 8.7 FL (ref 7.4–10.3)
PMV BLD AUTO: 8.8 FL (ref 7.4–10.3)
PMV BLD AUTO: 8.8 FL (ref 7.4–10.3)
POTASSIUM SERPL-SCNC: 4.1 MMOL/L (ref 3.3–5.1)
POTASSIUM SERPL-SCNC: 4.1 MMOL/L (ref 3.3–5.1)
POTASSIUM SERPL-SCNC: 4.2 MMOL/L (ref 3.3–5.1)
POTASSIUM SERPL-SCNC: 4.2 MMOL/L (ref 3.3–5.1)
POTASSIUM SERPL-SCNC: 4.3 MMOL/L (ref 3.3–5.1)
POTASSIUM SERPL-SCNC: 4.4 MMOL/L (ref 3.3–5.1)
POTASSIUM SERPL-SCNC: 4.5 MMOL/L (ref 3.3–5.1)
POTASSIUM SERPL-SCNC: 4.7 MMOL/L (ref 3.3–5.1)
POTASSIUM SERPL-SCNC: 5.6 MMOL/L (ref 3.3–5.1)
PROCALCITONIN SERPL-MCNC: 0.32 NG/ML (ref ?–0.11)
PROT SERPL-MCNC: 5.3 G/DL (ref 5.9–8.4)
PROT SERPL-MCNC: 5.5 G/DL (ref 5.9–8.4)
PROT SERPL-MCNC: 6.3 G/DL (ref 5.9–8.4)
PROTHROMBIN TIME: 13.6 SECONDS (ref 11.8–14.5)
RBC # BLD AUTO: 3.58 M/UL (ref 4.5–5.9)
RBC # BLD AUTO: 3.58 M/UL (ref 4.5–5.9)
RBC # BLD AUTO: 4.06 M/UL (ref 4.5–5.9)
RBC # BLD AUTO: 4.21 M/UL (ref 4.5–5.9)
RBC # BLD AUTO: 4.25 M/UL (ref 4.5–5.9)
RBC # BLD AUTO: 4.39 M/UL (ref 4.5–5.9)
RBC # BLD AUTO: 4.46 M/UL (ref 4.5–5.9)
RBC # FLD: 1334 /CUMM (ref ?–1)
RBC # FLD: 946 /CUMM (ref ?–1)
SODIUM SERPL-SCNC: 136 MMOL/L (ref 136–144)
SODIUM SERPL-SCNC: 136 MMOL/L (ref 136–144)
SODIUM SERPL-SCNC: 137 MMOL/L (ref 136–144)
SODIUM SERPL-SCNC: 138 MMOL/L (ref 136–144)
SODIUM SERPL-SCNC: 138 MMOL/L (ref 136–144)
SODIUM SERPL-SCNC: 139 MMOL/L (ref 136–144)
SODIUM SERPL-SCNC: 140 MMOL/L (ref 136–144)
TOTAL PROTEIN PLEURAL FLUID: 2.2 G/DL
TOTAL PROTEIN PLEURAL FLUID: 4.5 G/DL
TSH SERPL-ACNC: 3.2 UIU/ML (ref 0.45–5.33)
VANCOMYCIN SERPL-MCNC: 28 MCG/ML
VANCOMYCIN TROUGH SERPL-MCNC: 13.8 MCG/ML (ref 10–20)
VANCOMYCIN TROUGH SERPL-MCNC: 15.7 MCG/ML (ref 10–20)
VANCOMYCIN TROUGH SERPL-MCNC: 31 MCG/ML (ref 10–20)
WBC # BLD AUTO: 10.1 K/UL (ref 4–11)
WBC # BLD AUTO: 10.6 K/UL (ref 4–11)
WBC # BLD AUTO: 12.4 K/UL (ref 4–11)
WBC # BLD AUTO: 16.5 K/UL (ref 4–11)
WBC # BLD AUTO: 16.7 K/UL (ref 4–11)
WBC # BLD AUTO: 8 K/UL (ref 4–11)
WBC # BLD AUTO: 9.7 K/UL (ref 4–11)
WBC # FLD: 303 /CUMM (ref ?–1)
WBC # FLD: 4139 /CUMM (ref ?–1)

## 2017-01-01 PROCEDURE — 71020 XR CHEST PA + LAT CHEST (CPT=71020): CPT | Performed by: EMERGENCY MEDICINE

## 2017-01-01 PROCEDURE — G0463 HOSPITAL OUTPT CLINIC VISIT: HCPCS | Performed by: INTERNAL MEDICINE

## 2017-01-01 PROCEDURE — 81001 URINALYSIS AUTO W/SCOPE: CPT | Performed by: INTERNAL MEDICINE

## 2017-01-01 PROCEDURE — 93005 ELECTROCARDIOGRAM TRACING: CPT

## 2017-01-01 PROCEDURE — 71010 XR CHEST AP PORTABLE  (CPT=71010): CPT | Performed by: EMERGENCY MEDICINE

## 2017-01-01 PROCEDURE — 32555 ASPIRATE PLEURA W/ IMAGING: CPT | Performed by: INTERNAL MEDICINE

## 2017-01-01 PROCEDURE — 36415 COLL VENOUS BLD VENIPUNCTURE: CPT | Performed by: NURSE PRACTITIONER

## 2017-01-01 PROCEDURE — 84443 ASSAY THYROID STIM HORMONE: CPT

## 2017-01-01 PROCEDURE — 93306 TTE W/DOPPLER COMPLETE: CPT | Performed by: INTERNAL MEDICINE

## 2017-01-01 PROCEDURE — 0W993ZX DRAINAGE OF RIGHT PLEURAL CAVITY, PERCUTANEOUS APPROACH, DIAGNOSTIC: ICD-10-PCS | Performed by: INTERNAL MEDICINE

## 2017-01-01 PROCEDURE — 80048 BASIC METABOLIC PNL TOTAL CA: CPT | Performed by: NURSE PRACTITIONER

## 2017-01-01 PROCEDURE — 99215 OFFICE O/P EST HI 40 MIN: CPT | Performed by: PHYSICIAN ASSISTANT

## 2017-01-01 PROCEDURE — 99214 OFFICE O/P EST MOD 30 MIN: CPT

## 2017-01-01 PROCEDURE — 83735 ASSAY OF MAGNESIUM: CPT | Performed by: NURSE PRACTITIONER

## 2017-01-01 PROCEDURE — 84480 ASSAY TRIIODOTHYRONINE (T3): CPT

## 2017-01-01 PROCEDURE — 71010 XR CHEST AP PORTABLE  (CPT=71010): CPT | Performed by: INTERNAL MEDICINE

## 2017-01-01 PROCEDURE — 71010 XR CHEST AP/PA (1 VIEW) (CPT=71010): CPT | Performed by: INTERNAL MEDICINE

## 2017-01-01 PROCEDURE — 77001 FLUOROGUIDE FOR VEIN DEVICE: CPT

## 2017-01-01 PROCEDURE — 99233 SBSQ HOSP IP/OBS HIGH 50: CPT | Performed by: INTERNAL MEDICINE

## 2017-01-01 PROCEDURE — 99232 SBSQ HOSP IP/OBS MODERATE 35: CPT | Performed by: INTERNAL MEDICINE

## 2017-01-01 PROCEDURE — 87077 CULTURE AEROBIC IDENTIFY: CPT | Performed by: INTERNAL MEDICINE

## 2017-01-01 PROCEDURE — 99153 MOD SED SAME PHYS/QHP EA: CPT

## 2017-01-01 PROCEDURE — 96375 TX/PRO/DX INJ NEW DRUG ADDON: CPT

## 2017-01-01 PROCEDURE — 0W9B3ZX DRAINAGE OF LEFT PLEURAL CAVITY, PERCUTANEOUS APPROACH, DIAGNOSTIC: ICD-10-PCS | Performed by: INTERNAL MEDICINE

## 2017-01-01 PROCEDURE — 99152 MOD SED SAME PHYS/QHP 5/>YRS: CPT

## 2017-01-01 PROCEDURE — 71010 XR CHEST AP PORTABLE  (CPT=71010): CPT | Performed by: CLINICAL NURSE SPECIALIST

## 2017-01-01 PROCEDURE — 96413 CHEMO IV INFUSION 1 HR: CPT

## 2017-01-01 PROCEDURE — 74177 CT ABD & PELVIS W/CONTRAST: CPT | Performed by: INTERNAL MEDICINE

## 2017-01-01 PROCEDURE — 96367 TX/PROPH/DG ADDL SEQ IV INF: CPT

## 2017-01-01 PROCEDURE — 85025 COMPLETE CBC W/AUTO DIFF WBC: CPT | Performed by: NURSE PRACTITIONER

## 2017-01-01 PROCEDURE — 99222 1ST HOSP IP/OBS MODERATE 55: CPT | Performed by: INTERNAL MEDICINE

## 2017-01-01 PROCEDURE — 85025 COMPLETE CBC W/AUTO DIFF WBC: CPT

## 2017-01-01 PROCEDURE — 99223 1ST HOSP IP/OBS HIGH 75: CPT | Performed by: INTERNAL MEDICINE

## 2017-01-01 PROCEDURE — 99214 OFFICE O/P EST MOD 30 MIN: CPT | Performed by: NURSE PRACTITIONER

## 2017-01-01 PROCEDURE — 82607 VITAMIN B-12: CPT

## 2017-01-01 PROCEDURE — 99215 OFFICE O/P EST HI 40 MIN: CPT | Performed by: INTERNAL MEDICINE

## 2017-01-01 PROCEDURE — 84439 ASSAY OF FREE THYROXINE: CPT

## 2017-01-01 PROCEDURE — 0BBB8ZX EXCISION OF LEFT LOWER LOBE BRONCHUS, VIA NATURAL OR ARTIFICIAL OPENING ENDOSCOPIC, DIAGNOSTIC: ICD-10-PCS | Performed by: INTERNAL MEDICINE

## 2017-01-01 PROCEDURE — G0463 HOSPITAL OUTPT CLINIC VISIT: HCPCS | Performed by: PHYSICIAN ASSISTANT

## 2017-01-01 PROCEDURE — 82306 VITAMIN D 25 HYDROXY: CPT

## 2017-01-01 PROCEDURE — 80061 LIPID PANEL: CPT

## 2017-01-01 PROCEDURE — 87086 URINE CULTURE/COLONY COUNT: CPT | Performed by: INTERNAL MEDICINE

## 2017-01-01 PROCEDURE — 87186 SC STD MICRODIL/AGAR DIL: CPT | Performed by: INTERNAL MEDICINE

## 2017-01-01 PROCEDURE — 71010 XR CHEST AP PORTABLE  (CPT=71010): CPT | Performed by: HOSPITALIST

## 2017-01-01 PROCEDURE — 99212 OFFICE O/P EST SF 10 MIN: CPT | Performed by: NURSE PRACTITIONER

## 2017-01-01 PROCEDURE — 93010 ELECTROCARDIOGRAM REPORT: CPT | Performed by: NURSE PRACTITIONER

## 2017-01-01 PROCEDURE — 83036 HEMOGLOBIN GLYCOSYLATED A1C: CPT

## 2017-01-01 PROCEDURE — 84100 ASSAY OF PHOSPHORUS: CPT

## 2017-01-01 PROCEDURE — 0JH60XZ INSERTION OF TUNNELED VASCULAR ACCESS DEVICE INTO CHEST SUBCUTANEOUS TISSUE AND FASCIA, OPEN APPROACH: ICD-10-PCS | Performed by: RADIOLOGY

## 2017-01-01 PROCEDURE — 36561 INSERT TUNNELED CV CATH: CPT

## 2017-01-01 PROCEDURE — 83735 ASSAY OF MAGNESIUM: CPT

## 2017-01-01 PROCEDURE — 71260 CT THORAX DX C+: CPT | Performed by: EMERGENCY MEDICINE

## 2017-01-01 PROCEDURE — 96409 CHEMO IV PUSH SNGL DRUG: CPT

## 2017-01-01 PROCEDURE — 0B9B8ZZ DRAINAGE OF LEFT LOWER LOBE BRONCHUS, VIA NATURAL OR ARTIFICIAL OPENING ENDOSCOPIC: ICD-10-PCS | Performed by: INTERNAL MEDICINE

## 2017-01-01 PROCEDURE — 0BJ08ZZ INSPECTION OF TRACHEOBRONCHIAL TREE, VIA NATURAL OR ARTIFICIAL OPENING ENDOSCOPIC: ICD-10-PCS | Performed by: INTERNAL MEDICINE

## 2017-01-01 PROCEDURE — 71020 XR CHEST PA + LAT CHEST (CPT=71020): CPT | Performed by: INTERNAL MEDICINE

## 2017-01-01 PROCEDURE — 36591 DRAW BLOOD OFF VENOUS DEVICE: CPT

## 2017-01-01 PROCEDURE — 0B9M8ZX DRAINAGE OF BILATERAL LUNGS, VIA NATURAL OR ARTIFICIAL OPENING ENDOSCOPIC, DIAGNOSTIC: ICD-10-PCS | Performed by: INTERNAL MEDICINE

## 2017-01-01 PROCEDURE — 0B968ZZ DRAINAGE OF RIGHT LOWER LOBE BRONCHUS, VIA NATURAL OR ARTIFICIAL OPENING ENDOSCOPIC: ICD-10-PCS | Performed by: INTERNAL MEDICINE

## 2017-01-01 PROCEDURE — 02H633Z INSERTION OF INFUSION DEVICE INTO RIGHT ATRIUM, PERCUTANEOUS APPROACH: ICD-10-PCS | Performed by: RADIOLOGY

## 2017-01-01 PROCEDURE — 80053 COMPREHEN METABOLIC PANEL: CPT

## 2017-01-01 PROCEDURE — 71250 CT THORAX DX C-: CPT | Performed by: EMERGENCY MEDICINE

## 2017-01-01 PROCEDURE — 99291 CRITICAL CARE FIRST HOUR: CPT | Performed by: HOSPITALIST

## 2017-01-01 PROCEDURE — 0B9P30Z DRAINAGE OF LEFT PLEURA WITH DRAINAGE DEVICE, PERCUTANEOUS APPROACH: ICD-10-PCS | Performed by: RADIOLOGY

## 2017-01-01 PROCEDURE — 0W9B3ZZ DRAINAGE OF LEFT PLEURAL CAVITY, PERCUTANEOUS APPROACH: ICD-10-PCS | Performed by: INTERNAL MEDICINE

## 2017-01-01 PROCEDURE — 87493 C DIFF AMPLIFIED PROBE: CPT | Performed by: INTERNAL MEDICINE

## 2017-01-01 PROCEDURE — 71020 XR CHEST PA + LAT CHEST (CPT=71020): CPT

## 2017-01-01 PROCEDURE — 99213 OFFICE O/P EST LOW 20 MIN: CPT

## 2017-01-01 PROCEDURE — 70553 MRI BRAIN STEM W/O & W/DYE: CPT | Performed by: INTERNAL MEDICINE

## 2017-01-01 PROCEDURE — 78306 BONE IMAGING WHOLE BODY: CPT | Performed by: INTERNAL MEDICINE

## 2017-01-01 PROCEDURE — 71250 CT THORAX DX C-: CPT | Performed by: INTERNAL MEDICINE

## 2017-01-01 RX ORDER — AMIODARONE HYDROCHLORIDE 200 MG/1
400 TABLET ORAL
Status: COMPLETED | OUTPATIENT
Start: 2017-01-01 | End: 2017-01-01

## 2017-01-01 RX ORDER — ACETAMINOPHEN 325 MG/1
TABLET ORAL EVERY 6 HOURS PRN
Status: CANCELLED | OUTPATIENT
Start: 2017-01-01

## 2017-01-01 RX ORDER — GARLIC EXTRACT 500 MG
1 CAPSULE ORAL DAILY
COMMUNITY
End: 2017-01-01

## 2017-01-01 RX ORDER — IPRATROPIUM BROMIDE AND ALBUTEROL SULFATE 2.5; .5 MG/3ML; MG/3ML
3 SOLUTION RESPIRATORY (INHALATION)
Status: DISCONTINUED | OUTPATIENT
Start: 2017-01-01 | End: 2017-01-01

## 2017-01-01 RX ORDER — HEPARIN SODIUM (PORCINE) LOCK FLUSH IV SOLN 100 UNIT/ML 100 UNIT/ML
SOLUTION INTRAVENOUS
Status: COMPLETED
Start: 2017-01-01 | End: 2017-01-01

## 2017-01-01 RX ORDER — 0.9 % SODIUM CHLORIDE 0.9 %
VIAL (ML) INJECTION
Status: COMPLETED
Start: 2017-01-01 | End: 2017-01-01

## 2017-01-01 RX ORDER — 0.9 % SODIUM CHLORIDE 0.9 %
VIAL (ML) INJECTION
Status: DISCONTINUED
Start: 2017-01-01 | End: 2017-01-01

## 2017-01-01 RX ORDER — SODIUM CHLORIDE 9 MG/ML
INJECTION, SOLUTION INTRAVENOUS CONTINUOUS
Status: DISCONTINUED | OUTPATIENT
Start: 2017-01-01 | End: 2017-01-01

## 2017-01-01 RX ORDER — METHYLPREDNISOLONE SODIUM SUCCINATE 40 MG/ML
40 INJECTION, POWDER, LYOPHILIZED, FOR SOLUTION INTRAMUSCULAR; INTRAVENOUS DAILY
Status: DISCONTINUED | OUTPATIENT
Start: 2017-01-01 | End: 2017-01-01

## 2017-01-01 RX ORDER — DILTIAZEM HYDROCHLORIDE 5 MG/ML
10 INJECTION INTRAVENOUS ONCE
Status: COMPLETED | OUTPATIENT
Start: 2017-01-01 | End: 2017-01-01

## 2017-01-01 RX ORDER — SODIUM CHLORIDE 9 MG/ML
INJECTION, SOLUTION INTRAVENOUS ONCE
Status: COMPLETED | OUTPATIENT
Start: 2017-01-01 | End: 2017-01-01

## 2017-01-01 RX ORDER — DOCUSATE SODIUM 100 MG/1
100 CAPSULE, LIQUID FILLED ORAL 2 TIMES DAILY
Status: DISCONTINUED | OUTPATIENT
Start: 2017-01-01 | End: 2017-01-01

## 2017-01-01 RX ORDER — DEXTROSE MONOHYDRATE 25 G/50ML
50 INJECTION, SOLUTION INTRAVENOUS AS NEEDED
Status: DISCONTINUED | OUTPATIENT
Start: 2017-01-01 | End: 2017-01-01

## 2017-01-01 RX ORDER — MIRTAZAPINE 15 MG/1
15 TABLET, FILM COATED ORAL NIGHTLY
Status: DISCONTINUED | OUTPATIENT
Start: 2017-01-01 | End: 2017-01-01

## 2017-01-01 RX ORDER — DIPHENHYDRAMINE HYDROCHLORIDE 50 MG/ML
INJECTION INTRAMUSCULAR; INTRAVENOUS EVERY 4 HOURS PRN
Status: CANCELLED | OUTPATIENT
Start: 2017-01-01

## 2017-01-01 RX ORDER — ZOLPIDEM TARTRATE 5 MG/1
5 TABLET ORAL NIGHTLY PRN
Status: DISCONTINUED | OUTPATIENT
Start: 2017-01-01 | End: 2017-01-01

## 2017-01-01 RX ORDER — PROCHLORPERAZINE MALEATE 10 MG
10 TABLET ORAL EVERY 6 HOURS PRN
Status: DISCONTINUED | OUTPATIENT
Start: 2017-01-01 | End: 2017-01-01

## 2017-01-01 RX ORDER — PIOGLITAZONEHYDROCHLORIDE 45 MG/1
45 TABLET ORAL DAILY
COMMUNITY
End: 2017-01-01

## 2017-01-01 RX ORDER — SODIUM CHLORIDE 9 MG/ML
INJECTION, SOLUTION INTRAVENOUS
Status: DISCONTINUED
Start: 2017-01-01 | End: 2017-01-01

## 2017-01-01 RX ORDER — MULTIVITAMIN/IRON/FOLIC ACID 18MG-0.4MG
750 TABLET ORAL DAILY
Status: DISCONTINUED | OUTPATIENT
Start: 2017-01-01 | End: 2017-01-01

## 2017-01-01 RX ORDER — AMIODARONE HYDROCHLORIDE 200 MG/1
400 TABLET ORAL 2 TIMES DAILY WITH MEALS
Status: DISCONTINUED | OUTPATIENT
Start: 2017-01-01 | End: 2017-01-01

## 2017-01-01 RX ORDER — LORAZEPAM 2 MG/ML
1 INJECTION INTRAMUSCULAR ONCE
Status: COMPLETED | OUTPATIENT
Start: 2017-01-01 | End: 2017-01-01

## 2017-01-01 RX ORDER — 0.9 % SODIUM CHLORIDE 0.9 %
10 VIAL (ML) INJECTION ONCE
Status: CANCELLED | OUTPATIENT
Start: 2018-01-01

## 2017-01-01 RX ORDER — IPRATROPIUM BROMIDE AND ALBUTEROL SULFATE 2.5; .5 MG/3ML; MG/3ML
3 SOLUTION RESPIRATORY (INHALATION) 3 TIMES DAILY
Qty: 1080 ML | Refills: 0 | Status: SHIPPED | OUTPATIENT
Start: 2017-01-01 | End: 2017-01-01

## 2017-01-01 RX ORDER — LEVOTHYROXINE SODIUM 0.05 MG/1
50 TABLET ORAL
COMMUNITY
End: 2017-01-01

## 2017-01-01 RX ORDER — METOPROLOL SUCCINATE 50 MG/1
150 TABLET, EXTENDED RELEASE ORAL NIGHTLY
COMMUNITY
End: 2017-01-01

## 2017-01-01 RX ORDER — ONDANSETRON 2 MG/ML
4 INJECTION INTRAMUSCULAR; INTRAVENOUS EVERY 4 HOURS PRN
Status: DISCONTINUED | OUTPATIENT
Start: 2017-01-01 | End: 2017-01-01

## 2017-01-01 RX ORDER — FUROSEMIDE 10 MG/ML
40 INJECTION INTRAMUSCULAR; INTRAVENOUS EVERY 8 HOURS PRN
Status: DISCONTINUED | OUTPATIENT
Start: 2017-01-01 | End: 2017-01-01

## 2017-01-01 RX ORDER — FUROSEMIDE 40 MG/1
40 TABLET ORAL EVERY 8 HOURS PRN
Status: DISCONTINUED | OUTPATIENT
Start: 2017-01-01 | End: 2017-01-01

## 2017-01-01 RX ORDER — MIDAZOLAM HYDROCHLORIDE 1 MG/ML
INJECTION INTRAMUSCULAR; INTRAVENOUS
Status: COMPLETED
Start: 2017-01-01 | End: 2017-01-01

## 2017-01-01 RX ORDER — 0.9 % SODIUM CHLORIDE 0.9 %
10 VIAL (ML) INJECTION ONCE
Status: COMPLETED | OUTPATIENT
Start: 2017-01-01 | End: 2017-01-01

## 2017-01-01 RX ORDER — PIOGLITAZONEHYDROCHLORIDE 15 MG/1
45 TABLET ORAL DAILY
Status: DISCONTINUED | OUTPATIENT
Start: 2017-01-01 | End: 2017-01-01

## 2017-01-01 RX ORDER — DILTIAZEM HYDROCHLORIDE 120 MG/1
120 CAPSULE, COATED, EXTENDED RELEASE ORAL DAILY
Status: DISCONTINUED | OUTPATIENT
Start: 2017-01-01 | End: 2017-01-01

## 2017-01-01 RX ORDER — RANITIDINE 25 MG/ML
50 INJECTION, SOLUTION INTRAMUSCULAR; INTRAVENOUS AS NEEDED
Status: CANCELLED | OUTPATIENT
Start: 2017-01-01

## 2017-01-01 RX ORDER — MULTIVITAMIN/IRON/FOLIC ACID 18MG-0.4MG
500 TABLET ORAL 2 TIMES DAILY
Status: DISCONTINUED | OUTPATIENT
Start: 2017-01-01 | End: 2017-01-01

## 2017-01-01 RX ORDER — OMEPRAZOLE 20 MG/1
20 CAPSULE, DELAYED RELEASE ORAL EVERY MORNING
COMMUNITY
End: 2017-01-01

## 2017-01-01 RX ORDER — METOPROLOL SUCCINATE 50 MG/1
150 TABLET, EXTENDED RELEASE ORAL DAILY
Status: DISCONTINUED | OUTPATIENT
Start: 2017-01-01 | End: 2017-01-01

## 2017-01-01 RX ORDER — ONDANSETRON 4 MG/1
2 TABLET, FILM COATED ORAL 3 TIMES DAILY PRN
Status: DISCONTINUED | OUTPATIENT
Start: 2017-01-01 | End: 2017-01-01

## 2017-01-01 RX ORDER — DILTIAZEM HYDROCHLORIDE 5 MG/ML
5 INJECTION INTRAVENOUS ONCE
Status: COMPLETED | OUTPATIENT
Start: 2017-01-01 | End: 2017-01-01

## 2017-01-01 RX ORDER — MEPERIDINE HYDROCHLORIDE 25 MG/ML
INJECTION INTRAMUSCULAR; INTRAVENOUS; SUBCUTANEOUS AS NEEDED
Status: CANCELLED | OUTPATIENT
Start: 2017-01-01

## 2017-01-01 RX ORDER — ASPIRIN 81 MG/1
81 TABLET, CHEWABLE ORAL DAILY
Status: DISCONTINUED | OUTPATIENT
Start: 2017-01-01 | End: 2017-01-01

## 2017-01-01 RX ORDER — HEPARIN SODIUM (PORCINE) LOCK FLUSH IV SOLN 100 UNIT/ML 100 UNIT/ML
5 SOLUTION INTRAVENOUS ONCE
Status: COMPLETED | OUTPATIENT
Start: 2017-01-01 | End: 2017-01-01

## 2017-01-01 RX ORDER — METHYLPREDNISOLONE SODIUM SUCCINATE 40 MG/ML
40 INJECTION, POWDER, LYOPHILIZED, FOR SOLUTION INTRAMUSCULAR; INTRAVENOUS EVERY 12 HOURS
Status: DISCONTINUED | OUTPATIENT
Start: 2017-01-01 | End: 2017-01-01

## 2017-01-01 RX ORDER — MORPHINE SULFATE 2 MG/ML
2 INJECTION, SOLUTION INTRAMUSCULAR; INTRAVENOUS EVERY 2 HOUR PRN
Status: DISCONTINUED | OUTPATIENT
Start: 2017-01-01 | End: 2017-01-01

## 2017-01-01 RX ORDER — PANTOPRAZOLE SODIUM 40 MG/1
40 TABLET, DELAYED RELEASE ORAL
Status: DISCONTINUED | OUTPATIENT
Start: 2017-01-01 | End: 2017-01-01

## 2017-01-01 RX ORDER — CYANOCOBALAMIN 1000 UG/ML
1000 INJECTION INTRAMUSCULAR; SUBCUTANEOUS ONCE
Status: COMPLETED | OUTPATIENT
Start: 2017-01-01 | End: 2017-01-01

## 2017-01-01 RX ORDER — ATORVASTATIN CALCIUM 40 MG/1
40 TABLET, FILM COATED ORAL NIGHTLY
COMMUNITY
End: 2017-01-01

## 2017-01-01 RX ORDER — PROCHLORPERAZINE MALEATE 10 MG
10 TABLET ORAL EVERY 6 HOURS PRN
Status: CANCELLED | OUTPATIENT
Start: 2017-01-01

## 2017-01-01 RX ORDER — ONDANSETRON 2 MG/ML
4 INJECTION INTRAMUSCULAR; INTRAVENOUS EVERY 6 HOURS PRN
Status: DISCONTINUED | OUTPATIENT
Start: 2017-01-01 | End: 2017-01-01

## 2017-01-01 RX ORDER — RAMIPRIL 10 MG/1
10 CAPSULE ORAL DAILY
Status: DISCONTINUED | OUTPATIENT
Start: 2017-01-01 | End: 2017-01-01

## 2017-01-01 RX ORDER — LORAZEPAM 2 MG/ML
2 INJECTION INTRAMUSCULAR EVERY 4 HOURS PRN
Status: DISCONTINUED | OUTPATIENT
Start: 2017-01-01 | End: 2017-01-01

## 2017-01-01 RX ORDER — DIGOXIN 0.25 MG/ML
250 INJECTION INTRAMUSCULAR; INTRAVENOUS ONCE
Status: COMPLETED | OUTPATIENT
Start: 2017-01-01 | End: 2017-01-01

## 2017-01-01 RX ORDER — MAGNESIUM OXIDE 400 MG (241.3 MG MAGNESIUM) TABLET
400 TABLET ONCE
Status: DISCONTINUED | OUTPATIENT
Start: 2017-01-01 | End: 2017-01-01

## 2017-01-01 RX ORDER — LACTOBACILLUS ACIDOPH-L.BULGARICUS 1 MILLION CELL CHEWABLE TABLET 1MM CELL
1 TABLET,CHEWABLE ORAL DAILY
Status: DISCONTINUED | OUTPATIENT
Start: 2017-01-01 | End: 2017-01-01

## 2017-01-01 RX ORDER — PIOGLITAZONEHYDROCHLORIDE 45 MG/1
45 TABLET ORAL DAILY
Status: DISCONTINUED | OUTPATIENT
Start: 2017-01-01 | End: 2017-01-01

## 2017-01-01 RX ORDER — 0.9 % SODIUM CHLORIDE 0.9 %
VIAL (ML) INJECTION
Status: DISCONTINUED
Start: 2017-01-01 | End: 2017-01-01 | Stop reason: WASHOUT

## 2017-01-01 RX ORDER — DILTIAZEM HYDROCHLORIDE 60 MG/1
60 TABLET, FILM COATED ORAL AS NEEDED
Status: DISCONTINUED | OUTPATIENT
Start: 2017-01-01 | End: 2017-01-01

## 2017-01-01 RX ORDER — AZITHROMYCIN 250 MG/1
500 TABLET, FILM COATED ORAL
Status: DISCONTINUED | OUTPATIENT
Start: 2017-01-01 | End: 2017-01-01

## 2017-01-01 RX ORDER — ATROPINE SULFATE 10 MG/ML
2 SOLUTION/ DROPS OPHTHALMIC EVERY 2 HOUR PRN
Status: DISCONTINUED | OUTPATIENT
Start: 2017-01-01 | End: 2017-01-01

## 2017-01-01 RX ORDER — PREDNISONE 20 MG/1
20 TABLET ORAL
Status: DISCONTINUED | OUTPATIENT
Start: 2017-01-01 | End: 2017-01-01

## 2017-01-01 RX ORDER — AMIODARONE HYDROCHLORIDE 200 MG/1
400 TABLET ORAL DAILY
Status: DISCONTINUED | OUTPATIENT
Start: 2017-01-01 | End: 2017-01-01

## 2017-01-01 RX ORDER — DIAZEPAM 5 MG/ML
INJECTION, SOLUTION INTRAMUSCULAR; INTRAVENOUS
Status: DISCONTINUED | OUTPATIENT
Start: 2017-01-01 | End: 2017-01-01 | Stop reason: HOSPADM

## 2017-01-01 RX ORDER — DILTIAZEM HYDROCHLORIDE 120 MG/1
120 CAPSULE, COATED, EXTENDED RELEASE ORAL DAILY
Qty: 90 CAPSULE | Refills: 0 | Status: SHIPPED | OUTPATIENT
Start: 2017-01-01 | End: 2017-01-01

## 2017-01-01 RX ORDER — ALPRAZOLAM 0.5 MG/1
0.5 TABLET, EXTENDED RELEASE ORAL NIGHTLY
Status: DISCONTINUED | OUTPATIENT
Start: 2017-01-01 | End: 2017-01-01

## 2017-01-01 RX ORDER — ACETYLCYSTEINE 200 MG/ML
2 SOLUTION ORAL; RESPIRATORY (INHALATION) 2 TIMES DAILY
Status: DISCONTINUED | OUTPATIENT
Start: 2017-01-01 | End: 2017-01-01

## 2017-01-01 RX ORDER — SODIUM CHLORIDE 9 MG/ML
INJECTION, SOLUTION INTRAVENOUS
Status: DISPENSED
Start: 2017-01-01 | End: 2017-01-01

## 2017-01-01 RX ORDER — SODIUM CHLORIDE 9 MG/ML
INJECTION, SOLUTION INTRAVENOUS
Status: COMPLETED
Start: 2017-01-01 | End: 2017-01-01

## 2017-01-01 RX ORDER — FOLIC ACID 1 MG/1
1 TABLET ORAL DAILY
Status: DISCONTINUED | OUTPATIENT
Start: 2017-01-01 | End: 2017-01-01

## 2017-01-01 RX ORDER — RAMIPRIL 10 MG/1
10 CAPSULE ORAL NIGHTLY
COMMUNITY
End: 2017-01-01

## 2017-01-01 RX ORDER — MIRTAZAPINE 15 MG/1
15 TABLET, FILM COATED ORAL NIGHTLY
Qty: 30 TABLET | Refills: 0 | Status: SHIPPED | OUTPATIENT
Start: 2017-01-01 | End: 2017-01-01

## 2017-01-01 RX ORDER — IPRATROPIUM BROMIDE AND ALBUTEROL SULFATE 2.5; .5 MG/3ML; MG/3ML
3 SOLUTION RESPIRATORY (INHALATION) EVERY 6 HOURS PRN
Status: CANCELLED | OUTPATIENT
Start: 2017-01-01

## 2017-01-01 RX ORDER — FUROSEMIDE 40 MG/1
40 TABLET ORAL DAILY
Status: DISCONTINUED | OUTPATIENT
Start: 2017-01-01 | End: 2017-01-01

## 2017-01-01 RX ORDER — ALBUTEROL SULFATE 90 UG/1
2 AEROSOL, METERED RESPIRATORY (INHALATION) AS NEEDED
Status: CANCELLED | OUTPATIENT
Start: 2017-01-01

## 2017-01-01 RX ORDER — HEPARIN SODIUM (PORCINE) LOCK FLUSH IV SOLN 100 UNIT/ML 100 UNIT/ML
SOLUTION INTRAVENOUS
Status: DISCONTINUED
Start: 2017-01-01 | End: 2017-01-01

## 2017-01-01 RX ORDER — POTASSIUM CHLORIDE 20 MEQ/1
40 TABLET, EXTENDED RELEASE ORAL ONCE
Status: COMPLETED | OUTPATIENT
Start: 2017-01-01 | End: 2017-01-01

## 2017-01-01 RX ORDER — HEPARIN SODIUM (PORCINE) LOCK FLUSH IV SOLN 100 UNIT/ML 100 UNIT/ML
SOLUTION INTRAVENOUS
Status: DISCONTINUED
Start: 2017-01-01 | End: 2017-01-01 | Stop reason: WASHOUT

## 2017-01-01 RX ORDER — THIAMINE HCL 100 MG
500 TABLET ORAL 2 TIMES DAILY
Qty: 60 TABLET | Refills: 0 | Status: SHIPPED | OUTPATIENT
Start: 2017-01-01 | End: 2017-01-01

## 2017-01-01 RX ORDER — PANTOPRAZOLE SODIUM 20 MG/1
20 TABLET, DELAYED RELEASE ORAL
Status: DISCONTINUED | OUTPATIENT
Start: 2017-01-01 | End: 2017-01-01

## 2017-01-01 RX ORDER — SCOLOPAMINE TRANSDERMAL SYSTEM 1 MG/1
1 PATCH, EXTENDED RELEASE TRANSDERMAL
Status: DISCONTINUED | OUTPATIENT
Start: 2017-01-01 | End: 2017-01-01

## 2017-01-01 RX ORDER — BUDESONIDE 0.5 MG/2ML
0.5 INHALANT ORAL
Status: DISCONTINUED | OUTPATIENT
Start: 2017-01-01 | End: 2017-01-01

## 2017-01-01 RX ORDER — MAGNESIUM OXIDE 400 MG (241.3 MG MAGNESIUM) TABLET
800 TABLET ONCE
Status: COMPLETED | OUTPATIENT
Start: 2017-01-01 | End: 2017-01-01

## 2017-01-01 RX ORDER — HEPARIN SODIUM (PORCINE) LOCK FLUSH IV SOLN 100 UNIT/ML 100 UNIT/ML
5 SOLUTION INTRAVENOUS ONCE
Status: CANCELLED | OUTPATIENT
Start: 2017-01-01

## 2017-01-01 RX ORDER — PIOGLITAZONEHYDROCHLORIDE 45 MG/1
45 TABLET ORAL NIGHTLY
Status: DISCONTINUED | OUTPATIENT
Start: 2017-01-01 | End: 2017-01-01

## 2017-01-01 RX ORDER — LIDOCAINE HYDROCHLORIDE 20 MG/ML
INJECTION, SOLUTION EPIDURAL; INFILTRATION; INTRACAUDAL; PERINEURAL
Status: COMPLETED
Start: 2017-01-01 | End: 2017-01-01

## 2017-01-01 RX ORDER — HEPARIN SODIUM (PORCINE) LOCK FLUSH IV SOLN 100 UNIT/ML 100 UNIT/ML
5 SOLUTION INTRAVENOUS ONCE
Status: CANCELLED | OUTPATIENT
Start: 2018-01-01

## 2017-01-01 RX ORDER — ONDANSETRON 2 MG/ML
4 INJECTION INTRAMUSCULAR; INTRAVENOUS ONCE
Status: COMPLETED | OUTPATIENT
Start: 2017-01-01 | End: 2017-01-01

## 2017-01-01 RX ORDER — LORAZEPAM 2 MG/ML
0.5 INJECTION INTRAMUSCULAR EVERY 4 HOURS PRN
Status: DISCONTINUED | OUTPATIENT
Start: 2017-01-01 | End: 2017-01-01

## 2017-01-01 RX ORDER — ASPIRIN 81 MG/1
81 TABLET, CHEWABLE ORAL DAILY
COMMUNITY
End: 2017-01-01

## 2017-01-01 RX ORDER — AZITHROMYCIN 250 MG/1
500 TABLET, FILM COATED ORAL ONCE
Status: COMPLETED | OUTPATIENT
Start: 2017-01-01 | End: 2017-01-01

## 2017-01-01 RX ORDER — GLYCOPYRROLATE 0.2 MG/ML
0.4 INJECTION, SOLUTION INTRAMUSCULAR; INTRAVENOUS
Status: DISCONTINUED | OUTPATIENT
Start: 2017-01-01 | End: 2017-01-01

## 2017-01-01 RX ORDER — ENOXAPARIN SODIUM 100 MG/ML
40 INJECTION SUBCUTANEOUS DAILY
Status: DISCONTINUED | OUTPATIENT
Start: 2017-01-01 | End: 2017-01-01

## 2017-01-01 RX ORDER — NITROGLYCERIN 0.4 MG/1
0.4 TABLET SUBLINGUAL EVERY 5 MIN PRN
Status: DISCONTINUED | OUTPATIENT
Start: 2017-01-01 | End: 2017-01-01

## 2017-01-01 RX ORDER — RAMIPRIL 10 MG/1
10 CAPSULE ORAL NIGHTLY
Status: DISCONTINUED | OUTPATIENT
Start: 2017-01-01 | End: 2017-01-01

## 2017-01-01 RX ORDER — SODIUM CHLORIDE 0.9 % (FLUSH) 0.9 %
10 SYRINGE (ML) INJECTION AS NEEDED
Status: DISCONTINUED | OUTPATIENT
Start: 2017-01-01 | End: 2017-01-01

## 2017-01-01 RX ORDER — MORPHINE SULFATE 2 MG/ML
2 INJECTION, SOLUTION INTRAMUSCULAR; INTRAVENOUS ONCE
Status: COMPLETED | OUTPATIENT
Start: 2017-01-01 | End: 2017-01-01

## 2017-01-01 RX ORDER — MORPHINE SULFATE 2 MG/ML
2 INJECTION, SOLUTION INTRAMUSCULAR; INTRAVENOUS EVERY 4 HOURS PRN
Status: DISCONTINUED | OUTPATIENT
Start: 2017-01-01 | End: 2017-01-01

## 2017-01-01 RX ORDER — ATORVASTATIN CALCIUM 40 MG/1
40 TABLET, FILM COATED ORAL NIGHTLY
Status: DISCONTINUED | OUTPATIENT
Start: 2017-01-01 | End: 2017-01-01

## 2017-01-01 RX ORDER — HYDROCODONE BITARTRATE AND ACETAMINOPHEN 5; 325 MG/1; MG/1
1 TABLET ORAL EVERY 6 HOURS PRN
Qty: 20 TABLET | Refills: 0 | Status: SHIPPED | OUTPATIENT
Start: 2017-01-01 | End: 2017-01-01 | Stop reason: ALTCHOICE

## 2017-01-01 RX ORDER — GUAIFENESIN 600 MG
1200 TABLET, EXTENDED RELEASE 12 HR ORAL 2 TIMES DAILY
COMMUNITY
End: 2017-01-01

## 2017-01-01 RX ORDER — GUAIFENESIN 600 MG
1200 TABLET, EXTENDED RELEASE 12 HR ORAL 2 TIMES DAILY
Status: DISCONTINUED | OUTPATIENT
Start: 2017-01-01 | End: 2017-01-01

## 2017-01-01 RX ORDER — FUROSEMIDE 40 MG/5ML
40 SOLUTION ORAL DAILY
Status: DISCONTINUED | OUTPATIENT
Start: 2017-01-01 | End: 2017-01-01

## 2017-01-01 RX ORDER — GUAIFENESIN 600 MG
600 TABLET, EXTENDED RELEASE 12 HR ORAL 2 TIMES DAILY
Status: DISCONTINUED | OUTPATIENT
Start: 2017-01-01 | End: 2017-01-01

## 2017-01-01 RX ORDER — ACETAMINOPHEN 325 MG/1
650 TABLET ORAL EVERY 6 HOURS PRN
Status: DISCONTINUED | OUTPATIENT
Start: 2017-01-01 | End: 2017-01-01

## 2017-01-01 RX ORDER — 0.9 % SODIUM CHLORIDE 0.9 %
VIAL (ML) INJECTION
Status: DISPENSED
Start: 2017-01-01 | End: 2017-01-01

## 2017-01-01 RX ORDER — ACETAMINOPHEN AND CODEINE PHOSPHATE 300; 30 MG/1; MG/1
1 TABLET ORAL EVERY 4 HOURS PRN
Status: DISCONTINUED | OUTPATIENT
Start: 2017-01-01 | End: 2017-01-01

## 2017-01-01 RX ORDER — ACETAMINOPHEN 325 MG/1
TABLET ORAL
Status: DISPENSED
Start: 2017-01-01 | End: 2017-01-01

## 2017-01-01 RX ORDER — 0.9 % SODIUM CHLORIDE 0.9 %
10 VIAL (ML) INJECTION ONCE
Status: CANCELLED | OUTPATIENT
Start: 2017-01-01

## 2017-01-01 RX ORDER — LOPERAMIDE HYDROCHLORIDE 2 MG/1
2 CAPSULE ORAL 3 TIMES DAILY PRN
Status: DISCONTINUED | OUTPATIENT
Start: 2017-01-01 | End: 2017-01-01

## 2017-01-01 RX ORDER — IPRATROPIUM BROMIDE AND ALBUTEROL SULFATE 2.5; .5 MG/3ML; MG/3ML
3 SOLUTION RESPIRATORY (INHALATION) EVERY 6 HOURS PRN
Status: DISCONTINUED | OUTPATIENT
Start: 2017-01-01 | End: 2017-01-01

## 2017-01-01 RX ORDER — IPRATROPIUM BROMIDE AND ALBUTEROL SULFATE 2.5; .5 MG/3ML; MG/3ML
3 SOLUTION RESPIRATORY (INHALATION) EVERY 6 HOURS PRN
Qty: 120 CONTAINER | Refills: 0 | Status: ON HOLD | OUTPATIENT
Start: 2017-01-01 | End: 2017-01-01

## 2017-01-01 RX ORDER — MORPHINE SULFATE 2 MG/ML
2 INJECTION, SOLUTION INTRAMUSCULAR; INTRAVENOUS EVERY 2 HOUR PRN
Status: CANCELLED | OUTPATIENT
Start: 2017-01-01

## 2017-01-01 RX ORDER — MAGNESIUM CARB/ALUMINUM HYDROX 105-160MG
296 TABLET,CHEWABLE ORAL DAILY PRN
Status: DISCONTINUED | OUTPATIENT
Start: 2017-01-01 | End: 2017-01-01

## 2017-01-01 RX ORDER — AMIODARONE HYDROCHLORIDE 400 MG/1
400 TABLET ORAL DAILY
Qty: 30 TABLET | Refills: 1 | Status: SHIPPED | OUTPATIENT
Start: 2017-01-01 | End: 2017-01-01

## 2017-01-01 RX ORDER — ONDANSETRON 4 MG/1
2 TABLET, FILM COATED ORAL 3 TIMES DAILY PRN
Qty: 30 TABLET | Refills: 0 | Status: SHIPPED | OUTPATIENT
Start: 2017-01-01 | End: 2017-01-01

## 2017-01-01 RX ORDER — ASPIRIN 81 MG/1
81 TABLET, CHEWABLE ORAL NIGHTLY
Status: DISCONTINUED | OUTPATIENT
Start: 2017-01-01 | End: 2017-01-01

## 2017-01-01 RX ORDER — MAGNESIUM OXIDE 400 MG (241.3 MG MAGNESIUM) TABLET
400 TABLET 3 TIMES DAILY
Status: DISCONTINUED | OUTPATIENT
Start: 2017-01-01 | End: 2017-01-01

## 2017-01-01 RX ORDER — SODIUM CHLORIDE 9 MG/ML
INJECTION, SOLUTION INTRAVENOUS
Status: DISCONTINUED
Start: 2017-01-01 | End: 2017-01-01 | Stop reason: WASHOUT

## 2017-01-01 RX ORDER — HEPARIN SODIUM (PORCINE) LOCK FLUSH IV SOLN 100 UNIT/ML 100 UNIT/ML
5 SOLUTION INTRAVENOUS ONCE
Status: DISCONTINUED | OUTPATIENT
Start: 2017-01-01 | End: 2017-01-01

## 2017-01-01 RX ORDER — MIRTAZAPINE 15 MG/1
30 TABLET, FILM COATED ORAL NIGHTLY
Status: DISCONTINUED | OUTPATIENT
Start: 2017-01-01 | End: 2017-01-01

## 2017-01-01 RX ORDER — AMIODARONE HYDROCHLORIDE 200 MG/1
200 TABLET ORAL
Status: DISCONTINUED | OUTPATIENT
Start: 2017-01-01 | End: 2017-01-01

## 2017-01-01 RX ORDER — 0.9 % SODIUM CHLORIDE 0.9 %
10 VIAL (ML) INJECTION ONCE
Status: DISCONTINUED | OUTPATIENT
Start: 2017-01-01 | End: 2017-01-01

## 2017-01-01 RX ORDER — IPRATROPIUM BROMIDE AND ALBUTEROL SULFATE 2.5; .5 MG/3ML; MG/3ML
3 SOLUTION RESPIRATORY (INHALATION) ONCE
Status: COMPLETED | OUTPATIENT
Start: 2017-01-01 | End: 2017-01-01

## 2017-01-01 RX ORDER — AZITHROMYCIN 250 MG/1
500 TABLET, FILM COATED ORAL EVERY 24 HOURS
Status: DISCONTINUED | OUTPATIENT
Start: 2017-01-01 | End: 2017-01-01

## 2017-01-01 RX ORDER — PROCHLORPERAZINE MALEATE 10 MG
10 TABLET ORAL EVERY 6 HOURS PRN
Qty: 60 TABLET | Refills: 1 | Status: SHIPPED | OUTPATIENT
Start: 2017-01-01 | End: 2017-01-01

## 2017-01-01 RX ORDER — METOPROLOL TARTRATE 50 MG/1
150 TABLET, FILM COATED ORAL DAILY
Status: ON HOLD | COMMUNITY
End: 2017-01-01

## 2017-01-01 RX ORDER — BISACODYL 10 MG
10 SUPPOSITORY, RECTAL RECTAL
Status: DISCONTINUED | OUTPATIENT
Start: 2017-01-01 | End: 2017-01-01

## 2017-01-01 RX ORDER — CYCLOBENZAPRINE HCL 10 MG
10 TABLET ORAL 3 TIMES DAILY PRN
Qty: 20 TABLET | Refills: 0 | Status: SHIPPED | OUTPATIENT
Start: 2017-01-01 | End: 2017-01-01

## 2017-01-01 RX ORDER — METOPROLOL SUCCINATE 100 MG/1
100 TABLET, EXTENDED RELEASE ORAL DAILY
Status: DISCONTINUED | OUTPATIENT
Start: 2017-01-01 | End: 2017-01-01

## 2017-01-01 RX ORDER — IPRATROPIUM BROMIDE AND ALBUTEROL SULFATE 2.5; .5 MG/3ML; MG/3ML
3 SOLUTION RESPIRATORY (INHALATION)
Status: CANCELLED | OUTPATIENT
Start: 2017-01-01

## 2017-01-01 RX ORDER — DIGOXIN 250 MCG
250 TABLET ORAL DAILY
Status: DISCONTINUED | OUTPATIENT
Start: 2017-01-01 | End: 2017-01-01

## 2017-01-01 RX ORDER — LORAZEPAM 2 MG/ML
1 INJECTION INTRAMUSCULAR EVERY 4 HOURS PRN
Status: DISCONTINUED | OUTPATIENT
Start: 2017-01-01 | End: 2017-01-01

## 2017-01-01 RX ORDER — LEVOTHYROXINE SODIUM 0.05 MG/1
50 TABLET ORAL
Status: DISCONTINUED | OUTPATIENT
Start: 2017-01-01 | End: 2017-01-01

## 2017-01-01 RX ORDER — DILTIAZEM HYDROCHLORIDE 5 MG/ML
10 INJECTION INTRAVENOUS
Status: DISCONTINUED | OUTPATIENT
Start: 2017-01-01 | End: 2017-01-01

## 2017-01-01 RX ORDER — MAGNESIUM OXIDE 400 MG (241.3 MG MAGNESIUM) TABLET
800 TABLET DAILY
Status: DISCONTINUED | OUTPATIENT
Start: 2017-01-01 | End: 2017-01-01

## 2017-01-01 RX ORDER — FUROSEMIDE 20 MG/1
20 TABLET ORAL ONCE
Status: COMPLETED | OUTPATIENT
Start: 2017-01-01 | End: 2017-01-01

## 2017-01-01 RX ORDER — THIAMINE HCL 100 MG
750 TABLET ORAL DAILY
COMMUNITY
End: 2017-01-01

## 2017-01-01 RX ORDER — IBUPROFEN 600 MG/1
600 TABLET ORAL EVERY 8 HOURS PRN
Qty: 20 TABLET | Refills: 0 | Status: SHIPPED | OUTPATIENT
Start: 2017-01-01 | End: 2017-01-01

## 2017-01-01 RX ADMIN — HEPARIN SODIUM (PORCINE) LOCK FLUSH IV SOLN 100 UNIT/ML 500 UNITS: 100 SOLUTION INTRAVENOUS at 11:25:00

## 2017-01-01 RX ADMIN — 0.9 % SODIUM CHLORIDE 10 ML: 0.9 % VIAL (ML) INJECTION at 13:15:00

## 2017-01-01 RX ADMIN — HEPARIN SODIUM (PORCINE) LOCK FLUSH IV SOLN 100 UNIT/ML: 100 SOLUTION INTRAVENOUS at 10:50:00

## 2017-01-01 RX ADMIN — SODIUM CHLORIDE: 9 INJECTION, SOLUTION INTRAVENOUS at 14:00:00

## 2017-01-01 RX ADMIN — HEPARIN SODIUM (PORCINE) LOCK FLUSH IV SOLN 100 UNIT/ML 500 UNITS: 100 SOLUTION INTRAVENOUS at 16:07:00

## 2017-01-01 RX ADMIN — HEPARIN SODIUM (PORCINE) LOCK FLUSH IV SOLN 100 UNIT/ML 500 UNITS: 100 SOLUTION INTRAVENOUS at 14:50:00

## 2017-07-15 NOTE — ED INITIAL ASSESSMENT (HPI)
Lifting 5 gallon bins of tar. Patient unable to lift up arm. Bruising noted on right side of ribs. Worse when he stretches. Denies cough fever chills.

## 2017-07-15 NOTE — ED PROVIDER NOTES
Patient Seen in: [facility] AND CLINICS Immediate Care In [address] [place] [address]    History   Patient presents with:  Muscle Pain    Stated Complaint: Rt Side Rib Pain    HPI    82-year-old male with history of trach collar here with complaints of right-sided rib pain ×3 d dysuria, flank pain and frequency. Musculoskeletal: Negative for back pain. Skin: Negative for rash. Neurological: Negative for weakness, light-headedness and headaches. All other systems reviewed and are negative.       Positive for stated complain No rash noted. He is not diaphoretic. Psychiatric: He has a normal mood and affect. Nursing note and vitals reviewed. ED Course   DIAGNOSTICS:   Labs:  No results found for this or any previous visit (from the past 24 hour(s)).     Imaging: mouth every 8 (eight) hours as needed for Pain or Fever., Print, Disp-20 tablet, R-0

## 2017-08-28 PROBLEM — R91.8 LUNG MASS: Status: ACTIVE | Noted: 2017-01-01

## 2017-08-28 PROBLEM — J18.9 COMMUNITY ACQUIRED PNEUMONIA OF LEFT LUNG, UNSPECIFIED PART OF LUNG: Status: ACTIVE | Noted: 2017-01-01

## 2017-08-28 PROBLEM — J90 PLEURAL EFFUSION: Status: ACTIVE | Noted: 2017-01-01

## 2017-08-28 NOTE — ED PROVIDER NOTES
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   Constitu ============================================================  ED Course  ------------------------------------------------------------  MDM       Pulse Ox: 94%, Normal, RA      Radiology findings: Xr Chest Pa + Lat Chest (ntt=[id number])    Result Date: 8/28/201

## 2017-08-29 PROBLEM — Z85.21 HISTORY OF CANCER OF LARYNX: Status: ACTIVE | Noted: 2017-01-01

## 2017-08-29 PROBLEM — E13.9 DIABETES 1.5, MANAGED AS TYPE 2 (HCC): Status: ACTIVE | Noted: 2017-01-01

## 2017-08-29 PROBLEM — C76.0: Status: ACTIVE | Noted: 2017-01-01

## 2017-08-29 NOTE — ED NOTES
Pt feels warm to touch upon assessment. Afebrile. Per wife, pt is normally warm to touch. Known pleural effusion to L lung, worsening cough and pain when coughing since Thursday.

## 2017-08-29 NOTE — ED PROVIDER NOTES
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 Constitutional and vital signs reviewed.       All other systems reviewed and negative except as noted paraneoplastic effusion, pe    ED Course   Nursing notes and Triage vitals reviewed  Labs Reviewed   CBC W/ DIFFERENTIAL - Abnormal; Notable for the following:        Result Value    RBC 4.46 (*)     HCT 40.1 (*)     Neutrophil Absolute 7.8 (*)     All oth were created. FINDINGS:    VASCULATURE: There is adequate opacification of the pulmonary arterial     tree.  No suspicious filling defects are identified in the main, lobar,     segmental, or proximal subsegmental pulmonary artery branches to sugg subcarinal lymph node that measures 2.4 x 2.3 cm. There is a gastroesophageal junction lymph node that measures 1.1 x 0.9     cm. An additional right paratracheal     lymph node just above the level the marvin measures 1.7 x 1.3 cm.  There     are enlar indeterminate but possibly metastatic. 6. Emphysema. 7. Coronary atherosclerosis. 8. Small hiatal hernia. 9. Post laryngectomy with tracheostomy. 10. Chronic/healed right lateral sixth rib fracture.     11. Lesser incidental findings as ED Medications Administered:   Medications   Vancomycin HCl (VANCOCIN) 1,000 mg in sodium chloride 0.9 % 250 mL IVPB add-vantage (not administered)   Piperacillin Sod-Tazobactam So (ZOSYN) 3.375 g in dextrose 5 % 100 mL IVPB (3.375 g Intravenous [place] interpretation of the above emergency department workup, the patient was found to have Pleural effusion  (primary encounter diagnosis)  Community acquired pneumonia of left lung, unspecified part of lung  Lung mass    Plan: broad spec abx, admit.   Further

## 2017-08-29 NOTE — PROCEDURES
[facility] 4W/SW/SE  Post Procedure Staging Note    Jordi Jamestawnya Patient Status:  Inpatient    1945 MRN [id number]   Location [facility] 4W/SW/SE Attending [address], [address] [facility] Day # 1 PCP Natalie Weber MD       Proc

## 2017-08-29 NOTE — PROGRESS NOTES
[place] [facility]    Progress Note    Joe Katz Patient Status:  Inpatient    1945 MRN [id number]   Location [facility] 4W/SW/SE Attending Joe Katz, 768 [facility] Day # 1 PCP Darryle Bowers, MD       Subjective:   Ro hazy opacity at the left lung base with at least moderate left pleural effusion. Findings raise suspicion for left lower lobe pneumonia with parapneumonic effusion. Posttreatment followup to resolution is recommended.  2. Possible subtle approximate 1.0 cm

## 2017-08-29 NOTE — PROGRESS NOTES
[facility] dosing service    Initial Pharmacokinetic Consult for Vancomycin Dosing     Blanquita Lovell is a 81year old male who is being treated for pneumonia.   Pharmacy has been asked to dose Vancomycin by Dr. Rae Franco    He has No Known Allergi Pharmacy Extension: [phone]

## 2017-08-29 NOTE — ED INITIAL ASSESSMENT (HPI)
Pt reports some SOB and pain with inspiration. Sent from immediate care where xray showed pleural effusion.

## 2017-08-29 NOTE — PLAN OF CARE
Diabetes/Glucose Control    • Glucose maintained within prescribed range Progressing        PAIN - ADULT    • Verbalizes/displays adequate comfort level or patient's stated pain goal Progressing        Patient/Family Goals    • Patient/Family [facility]

## 2017-08-29 NOTE — CONSULTS
[facility]    PATIENT'S NAME: Ramírez Gamble   ATTENDING PHYSICIAN: Martha Brunson MD   CONSULTING PHYSICIAN: Berna Jennings.  Michoacano Resendiz MD   PATIENT ACCOUNT#:   [id number]    LOCATION:  [address] [place] [address] Rd #:   [id number]       DATE OF SAHIL tinnitus, impaired hearing. No rhinorrhea or epistaxis. No sore throat. No neck stiffness. Respiratory, see the present illnesses. No orthopnea, paroxysmal nocturnal dyspnea. No nausea, vomiting, diarrhea. No dysuria or hematuria.       PHYSICAL EXA

## 2017-08-29 NOTE — PLAN OF CARE
Diabetes/Glucose Control    • Glucose maintained within prescribed range Progressing        PAIN - ADULT    • Verbalizes/displays adequate comfort level or patient's stated pain goal Progressing        RESPIRATORY - ADULT    • Achieves optimal ventilation

## 2017-08-29 NOTE — CM/SW NOTE
CTL met with pt at bedside. Pt is BPCI eligible under . [employer] program reviewed with pt including 90 day phone call follow up. Pt agrees. [employer] Brochure and Medicare Letter provided. Note:  Pt was enrolled on 8/28/17.

## 2017-08-30 NOTE — PROGRESS NOTES
[place] [facility] - [facility]    Progress Note    Petersburg Parcel Patient Status:  Inpatient    1945 MRN [id number]   Location [facility] 4W/SW/SE Attending [address], [address] Day # 2 PCP Riky Massey MD       Subjective:   Ro Xr Chest Ap Portable  (cpt=71010)    Result Date: 8/29/2017  CONCLUSION:   Improved left pleural effusion and left basilar aeration. Pulmonary nodules present on recent chest CT are not well visualized radiographically.       Xr Chest Ap Portable  (cpt of nose ([facility] [address]    Diabetes 1.5, managed as type 2 ([facility] [address]    Less sob and cough,pl fluid cytology, metastatic poorly diferentiated adenocarcinoma,continue antibiotic,flexible bronchoscopy valerie Rankin MD, MD  8/30/2017

## 2017-08-30 NOTE — PLAN OF CARE
Diabetes/Glucose Control    • Glucose maintained within prescribed range Progressing        PAIN - ADULT    • Verbalizes/displays adequate comfort level or patient's stated pain goal Progressing        Patient/Family Goals    • Patient/Family [facility]

## 2017-08-30 NOTE — PROGRESS NOTES
120 [facility] dosing service    Follow-up Pharmacokinetic Consult for Vancomycin Dosing     Doreen Mesa is a 81year old male admitted on 8/28 who is being treated for pneumonia. Patient is on day 3 of Vancomycin 1.25 gm IV Q 12 hours.   Goal troug Extensive consolidative airspace opacity in the left lower lobe with multifocal left lower lobe bronchial impaction. Findings raise suspicion for endobronchial neoplasm with postobstructive left lower lobe pneumonia. Based on the above:    1.  Continue V

## 2017-08-30 NOTE — PROGRESS NOTES
08/30/17 1632   Clinical Encounter Type   Visited With Patient   Surgical Visit Pre-op     Brief, introductory visit with patient and her . Shared information regarding [place] at [address] [place] [address].

## 2017-08-30 NOTE — PROGRESS NOTES
17[address] [facility]    CDI Prediction Tool Protocol (Vancomycin Initiated)    OVP (oral vancomycin prophylaxis) 125 mg PO BID is being started in this patient based on a score of 13.   This patient is currently at high risk for developing CDI due to his/h

## 2017-08-30 NOTE — H&P
[facility]    PATIENT'S NAME: Carlisle Habermann   ATTENDING PHYSICIAN: Joseph Montaño MD   PATIENT ACCOUNT#:   [id number]    LOCATION:  [facility] [address] RECORD #:   [id number]       YOB: 1945  ADMISSION DATE:       0 consolidation and also pleural effusion. CT of the chest showed excentric consolidation with a moderate left pleural effusion, multiple noncalcified lymph nodes, extensive mediastinal lymphadenopathy. ASSESSMENT AND PLAN:    1.    Status post obstruct

## 2017-08-30 NOTE — PROGRESS NOTES
[place] [facility] - [facility]    Progress Note    Sergei Scale Patient Status:  Inpatient    1945 MRN [id number]   Location [facility] 4W/SW/SE Attending [address], 768 [facility] Day # 2 PCP Estefani Foote MD       Subjective:   Ro EC tab 20 mg 20 mg Oral QAM AC   ramipril (ALTACE) cap 10 mg Oral Daily   ipratropium-albuterol (DUONEB) nebulizer solution 3 mL 3 mL Nebulization Q6H WA   ipratropium-albuterol (DUONEB) nebulizer solution 3 mL 3 mL Nebulization Q6H PRN   acetaminophen (TY nodular density projecting at the right lung base, which may also be infectious in etiology. Ct Chest Pain/pe (iv Only) Em    Result Date: 8/28/2017  CONCLUSION:  1. No evidence of pulmonary embolism.  2. Extensive consolidative airspace opacity in TBD.        Jeffery Hernandez MD  8/30/2017

## 2017-08-30 NOTE — PLAN OF CARE
Diabetes/Glucose Control    • Glucose maintained within prescribed range Progressing        PAIN - ADULT    • Verbalizes/displays adequate comfort level or patient's stated pain goal Progressing        Patient/Family Goals    • Patient/Family [facility]

## 2017-08-31 NOTE — BRIEF OP NOTE
[facility] ENDOSCOPY LAB SUITES  Brief Op Note     Mar Foley Patient Status:  Inpatient    1945 MRN [id number]   Location [facility] ENDOSCOPY LAB SUITES Attending [address], [address] Day # 3 PCP Oscar Echeverria MD

## 2017-08-31 NOTE — PROGRESS NOTES
[place] [facility] - [facility]    Progress Note    Kathryn Back Patient Status:  Inpatient    1945 MRN [id number]   Location [facility] 4W/SW/SE Attending [address], [address] Day # 3 PCP Ehsan Sandoval MD       Subjective:   Ro Sodium (PROTONIX) EC tab 20 mg 20 mg Oral QAM AC   ramipril (ALTACE) cap 10 mg Oral Daily   ipratropium-albuterol (DUONEB) nebulizer solution 3 mL 3 mL Nebulization Q6H WA   ipratropium-albuterol (DUONEB) nebulizer solution 3 mL 3 mL Nebulization Q6H PRN

## 2017-08-31 NOTE — OPERATIVE REPORT
[facility]    PATIENT'S NAME: Candice Sanchez   ATTENDING PHYSICIAN: Jovita George MD   OPERATING PHYSICIAN: Elzbieta Joaquin.  Luis Alfredo Frazier MD   PATIENT ACCOUNT#:   [id number]    LOCATION:  [address] [place] [address] #:   [id number]       DATE OF BIRTH

## 2017-08-31 NOTE — PROGRESS NOTES
[facility]    Progress Note    Diane Moralez Patient Status:  Inpatient    1945 MRN [id number]   Location [facility] ENDOSCOPY LAB SUITES Attending [address], 768 [facility] Day # 3 PCP MD Larisa Chaves

## 2017-08-31 NOTE — PLAN OF CARE
Diabetes/Glucose Control    • Glucose maintained within prescribed range Progressing        PAIN - ADULT    • Verbalizes/displays adequate comfort level or patient's stated pain goal Progressing        Patient/Family Goals    • Patient/Family [facility]

## 2017-08-31 NOTE — PLAN OF CARE
Diabetes/Glucose Control    • Glucose maintained within prescribed range Progressing        PAIN - ADULT    • Verbalizes/displays adequate comfort level or patient's stated pain goal Progressing        Patient/Family Goals    • Patient/Family [facility]

## 2017-09-01 PROBLEM — C76.0: Status: RESOLVED | Noted: 2017-01-01 | Resolved: 2017-01-01

## 2017-09-01 PROBLEM — Z85.21 HISTORY OF CANCER OF LARYNX: Status: RESOLVED | Noted: 2017-01-01 | Resolved: 2017-01-01

## 2017-09-01 PROBLEM — C34.32 MALIGNANT NEOPLASM OF LOWER LOBE OF LEFT LUNG (HCC): Status: ACTIVE | Noted: 2017-01-01

## 2017-09-01 NOTE — CONSULTS
IP consult to Oncology Once  Consult performed by: Byron Burdick ordered by: Joey Blake  Reason for consult: new diagnosis of lung cancer          [facility]    Report of Hematology/Oncology Consultation    Darylene Lawn The patient then underwent a CT of the chest with PE protocol on 08/28/2017. There was no evidence of pulmonary embolus.   There was extensive consolidation in the left lower lobe with multifocal left lower lobe bronchial impaction, findings raise suspicio Malignant neoplasm of lower lobe of left lung ([facility] [address]    Staging form: Lung AJCC V7    - Clinical stage from 9/1/2017: Stage IV (T4, N3, M1a) - Signed by Jazmin Del Castillo MD on 9/1/2017      Past Medical History:   Diagnosis Date   • Cancer of nasal cavity an Metoprolol Tartrate 50 MG Oral Tab Take 150 mg by mouth daily. Disp:  Rfl:    MetFORMIN HCl 1000 MG Oral Tab Take 1,000 mg by mouth 2 (two) times daily with meals.  Disp:  Rfl:      • acetaminophen       • LORazepam  1 mg Intravenous Once   • vancomycin  12 Genitourinary: Negative for dysuria, hematuria and difficulty urinating. Musculoskeletal: Negative for back pain, gait problem and neck pain. Neurological: Negative for dizziness, weakness, light-headedness, numbness and headaches.    Hematological: Neg Psychiatric: He has a normal mood and affect.  Judgment normal.         Laboratory Data:        Recent Results (from the past 24 hour(s))  -BASIC METABOLIC PANEL (8)   Collection Time: 09/01/17  9:02 AM   Result Value Ref Range   Glucose 151 (H) 70 - 99 mg/ CONCLUSION: Successful placement of left thoracic Pleurx catheter. Fluid removed during the procedure has been submitted for cytopathologic analysis. The catheter is ready for use.              Impression and Plan:      Omar Elizalde is a 81year old Thank you for allowing me to participate in the care of your patient. All questions answered to the best of my abilities.     50 minutes spent in the unit in direct patient care, review of records and coordination of care with greater than 50% of the vis

## 2017-09-01 NOTE — PROCEDURES
[facility]  Procedure Note    Mag Cross Patient Status:  Inpatient    1945 MRN [id number]   Location [facility] Attending [address], [address] Day # 4 PCP Miko Mccormick MD     Proced

## 2017-09-01 NOTE — PROGRESS NOTES
Patient sent for left pleurex placement. Returned to floor alert, oriented and stable. Tolerating diet. Refused novolog, wanted his victoza instead. No distress noted.

## 2017-09-01 NOTE — PROGRESS NOTES
[place] [facility] - [facility]    Progress Note    Marlyce Camera Patient Status:  Inpatient    1945 MRN [id number]   Location [facility] 4W/SW/SE Attending [address], [address] Day # 4 PCP Levi Carcamo MD       Subjective:   Ro pleurodex        Fe Seymour MD, MD  9/1/2017

## 2017-09-01 NOTE — CONSULTS
[facility]    Report of Consultation    Myriam Plasencia Patient Status:  Inpatient    1945 MRN [id number]   Location [facility] 4W/SW/SE Attending [address], 768 [facility] Day # 4 PCP Brandt Bernheim, MD     Date of Subcutaneous, Daily  •  magnesium oxide (MAG-OX) tab 400 mg, 400 mg, Oral, TID  •  Pantoprazole Sodium (PROTONIX) EC tab 20 mg, 20 mg, Oral, QAM AC  •  ramipril (ALTACE) cap, 10 mg, Oral, Daily  •  ipratropium-albuterol (DUONEB) nebulizer solution 3 mL, 3 and risks of Pleurx catheter placement. Spent over 25 minutes with patient and his wife reviewing drain care. Recommendations:  Pleurx catheter insertion later today. Thank you for allowing me to participate in the care of your patient.     Bobby Ayers

## 2017-09-01 NOTE — PROGRESS NOTES
[facility]    Progress Note    Eliot Claudiorey Patient Status:  Inpatient    1945 MRN [id number]   Location [facility] 4W/SW/SE Attending [address], 768 [facility] Day # 4 PCP Alexandrea Kunz MD       Subjective:   Ro Oral QAM AC   ramipril (ALTACE) cap 10 mg Oral Daily   ipratropium-albuterol (DUONEB) nebulizer solution 3 mL 3 mL Nebulization Q6H WA   ipratropium-albuterol (DUONEB) nebulizer solution 3 mL 3 mL Nebulization Q6H PRN   acetaminophen (TYLENOL) tab 650 mg 6

## 2017-09-02 NOTE — PROGRESS NOTES
[place] [facility] - [facility]    Progress Note    Treyshelton Rangel Patient Status:  Inpatient    1945 MRN [id number]   Location [facility] 4W/SW/SE Attending [address], [address] Day # 5 PCP Siri Talley MD       Subjective:   Ro normal    Assessment and Plan:     Pleural effusion  Status post Pleurx catheter      Community acquired pneumonia of left lung, unspecified part of lung  Antibiotics      Malignant neoplasm of lower lobe of left lung [facility])  Oncology follows      Diabetes

## 2017-09-02 NOTE — PROGRESS NOTES
[place] [facility] - [facility]    Progress Note    Ed Hernandez Patient Status:  Inpatient    1945 MRN [id number]   Location [facility] 4W/SW/SE Attending [address], 768 [facility] Day # 5 PCP Ayesha Valencia MD       Subjective:   Ro SOPN 1.8 mg 1.8 mg Subcutaneous Daily   magnesium oxide (MAG-OX) tab 400 mg 400 mg Oral TID   Pantoprazole Sodium (PROTONIX) EC tab 20 mg 20 mg Oral QAM AC   ramipril (ALTACE) cap 10 mg Oral Daily   ipratropium-albuterol (DUONEB) nebulizer solution 3 mL 3 9/1/2017  CONCLUSION: Successful placement of left thoracic Pleurx catheter. Fluid removed during the procedure has been submitted for cytopathologic analysis. The catheter is ready for use.                    Assessment and Plan:       Malignant Pleural ef

## 2017-09-02 NOTE — PLAN OF CARE
Diabetes/Glucose Control    • Glucose maintained within prescribed range Progressing        PAIN - ADULT    • Verbalizes/displays adequate comfort level or patient's stated pain goal Progressing        Patient/Family Goals    • Patient/Family [facility]

## 2017-09-02 NOTE — PROGRESS NOTES
[facility]  Oncology  Progress Note    Garett Guardian Patient Status:  Inpatient    1945 MRN [id number]   Location [facility] 4W/SW/SE Attending [address], [address] Day # 5 PCP Mindy Cho MD     Subjective:  [employer] incidental findings as above    Medications reviewed.     Assessment and Plan:  Patient Active Problem List:     Pleural effusion     Community acquired pneumonia of left lung, unspecified part of lung     Malignant neoplasm of lower lobe of left lung ([facility] [address]

## 2017-09-02 NOTE — PLAN OF CARE
Diabetes/Glucose Control    • Glucose maintained within prescribed range Progressing        PAIN - ADULT    • Verbalizes/displays adequate comfort level or patient's stated pain goal Progressing        Patient/Family Goals    • Patient/Family [facility]

## 2017-09-03 NOTE — PROGRESS NOTES
[place] [facility] - [facility]    Progress Note    Elly Pack Patient Status:  Inpatient    1945 MRN [id number]   Location [facility] 4W/SW/SE Attending [address], 768 [facility] Day # 6 PCP Panda Hdz MD       Subjective:   Ro (MAG-OX) tab 400 mg 400 mg Oral TID   Pantoprazole Sodium (PROTONIX) EC tab 20 mg 20 mg Oral QAM AC   ramipril (ALTACE) cap 10 mg Oral Daily   ipratropium-albuterol (DUONEB) nebulizer solution 3 mL 3 mL Nebulization Q6H WA   ipratropium-albuterol (DUONEB) Portable  (cpt=71010)    Result Date: 9/1/2017  CONCLUSION:  1. Persistent left base consolidation with worsening moderate-sized left pleural effusion. No pneumothorax. Questionable 1.3 cm size right basal nodule.          Ir Pleurex Catheter    Result Date

## 2017-09-03 NOTE — PROGRESS NOTES
[facility]    Progress Note    Gerda Rowe Patient Status:  Inpatient    1945 MRN [id number]   Location [facility] 4W/SW/SE Attending [address], [address] [facility] Day # 6 PCP MD Itz Farnsworth Community acquired pneumonia of left lung, unspecified part of lung  All cultures negative the patient received 6 days of IV antibiotics we will stop antibiotics it is okay to send patient home from pulmonary standpoint Pleurx catheter drainage twice a wee 9/1/2017  CONCLUSION:  1. Persistent left base consolidation with worsening moderate-sized left pleural effusion. No pneumothorax. Questionable 1.3 cm size right basal nodule.          Ir Pleurex Catheter    Result Date: 9/1/2017  CONCLUSION: Successful drew

## 2017-09-03 NOTE — HOME CARE LIAISON
MET WITH PATIENT TO DISCUSS HOME HEALTH SERVICES. PATIENT IN AGREEMENT WITH SERVICES BEING PROVIDED BY [facility]. PROVIDED RESIDENTIAL BROCHURE WITH CONTACT INFORMATION, ALONG WITH LIAISON'S BUSINESS CARD.  PATIENT IS ESTABLISHING CARE WITH PC

## 2017-09-03 NOTE — DISCHARGE PLANNING
Sw was informed that pt is a new pleurex and would benefit from [facility]. SW placed referral to [facility], will need specific orders for how much to drain and how often. Hospital will need to provide a few pleurex kits. Pt is anticipated to d/c today.      Marisol Barakat

## 2017-09-03 NOTE — PLAN OF CARE
Diabetes/Glucose Control    • Glucose maintained within prescribed range Progressing        PAIN - ADULT    • Verbalizes/displays adequate comfort level or patient's stated pain goal Progressing        Patient/Family Goals    • Patient/Family [facility]

## 2017-09-03 NOTE — PROGRESS NOTES
[facility] AND [facility]  Oncology  Progress Note    Abbie Elder Patient Status:  Inpatient    1945 MRN [id number]   Location [facility] 4W/SW/SE Attending [address], [address] [facility] Day # 6 PCP Judd Da Silva MD     Subjective:  Kendrick Winters 9/2/17  CONCLUSION:             1. Cerebral cortical atrophy and age-appropriate. Chronic small vessel ischemic changes. 2. Small lacunar type infarct in the right external/extreme capsule. 3. No abnormal intracranial enhancement.  No CNS metastasis visua

## 2017-09-13 PROBLEM — C34.32 MALIGNANT NEOPLASM OF LOWER LOBE OF LEFT LUNG (HCC): Status: RESOLVED | Noted: 2017-01-01 | Resolved: 2017-01-01

## 2017-09-13 PROBLEM — C34.90 NON-SMALL CELL LUNG CANCER (HCC): Status: ACTIVE | Noted: 2017-01-01

## 2017-09-13 NOTE — PROGRESS NOTES
Cancer Center Progress Note    Patient Name: Abbie Love   YOB: 1945   Medical Record Number: [id number]   CSN: [id number]   Consulting Physician: Erin Ramirez MD  Referring Physician(s): Shawna Osgood, MD  Date of Consultation: 9/13/2017 findings raise suspicion for endobronchial neoplasm with postobstructive left lower lobe pneumonia. There is a moderate left pleural effusion concerning for metastatic in nature.   There are multiple, at least 10, noncalcified right lung nodules measuring cavity and sinus ([facility] [address] 03/31/2011    Well-differentiated infiltrating squamous cell carcinoma.   The [place] [facility] [place] and adjuvant RT   • Diabetes [facility])    • Essential hypertension    • History of canc daily. Disp:  Rfl:    aspirin 81 MG Oral Chew Tab Chew 81 mg by mouth daily. Disp:  Rfl:    Pioglitazone HCl (ACTOS) 45 MG Oral Tab Take 45 mg by mouth daily. Disp:  Rfl:    ramipril 10 MG Oral Cap Take 10 mg by mouth daily.  Disp:  Rfl:    Metoprolol Tartr and rhythm. S1S2 normal.  Abdomen: Soft, non tender with good bowel sounds. No hepatosplenomegaly. No palpable mass. Extremities: No edema or calf tenderness. Neurological: Grossly intact.      Performance Status:    ECOG 0: Fully active, able to carry NSCLC, associated with malignant pleural effusion.     Plan:    1.) NSCLC - adenocarcinoma; malignant pleural effusion left sided w/ pleurx catheter in place    --Discussed with patient and his wife that his marker status returned with PDL-1 positive in 80% resources were reviewed and discussed with the pateint and family. I spent 40 minutes face to face with the patient. More than 50% of that time was spent counseling the patient and/or on coordination of care.   The diagnosis, prognosis, and general marjorie

## 2017-09-18 NOTE — PROCEDURES
[facility]  Procedure Note    [facility] Patient Status:  Outpatient in a Bed    1945 MRN [id number]   Location [facility] Attending Fannie Yates MD   Hosp Day # 0 PCP Britt Reynolds MD, MD     Pr

## 2017-09-19 NOTE — PATIENT INSTRUCTIONS
Medication Education Record: IV Therapy    Learner:  Patient and Family Member    Barriers / Limitations:  None    Diagnosis:   Lung cancer    IV Cancer Treatment Name(s): pembrolizumab  IV Cancer Treatment Frequency every 3 weeks    Number of cycles plann Anti-nausea medications (as directed by your provider):  Prochloperazine (Compazine) 10 mg every 6 hours    Helpful hints during cancer treatment:    Diet:  o Avoid greasy or spicy foods on days surrounding chemotherapy  o Eat small frequent meals per day contact the triage nurse for further instructions. Skin Care  o Avoid direct sunlight.  o Wear a broad-spectrum sunscreen with an SPF of 30 or higher on any skin exposed to the sun. Re-apply every 2 hours if in the sun and after bathing or sweating.   o F interested in additional information about chemotherapy for yourself or family members: http://www.jara.info/. html        Safety and Handling of Chemotherapy  While you or your family member is receiving chemotherapy, whether in the Effective birth control should be used throughout treatment to prevent pregnancy while on these medications and for several months or years after therapy. Chemotherapy can have harmful side effects to the fetus, especially in the first trimester.   In yashira

## 2017-09-19 NOTE — PROGRESS NOTES
Medication Education Record: IV Therapy    Learner:  Patient and Family Member    Barriers / Limitations:  None    Diagnosis:   Lung cancer    IV Cancer Treatment Name(s): pembrolizumab  IV Cancer Treatment Frequency every 3 weeks    Number of cycles plann Anti-nausea medications (as directed by your provider):  Prochloperazine (Compazine) 10 mg every 6 hours    Helpful hints during cancer treatment:    Diet:  o Avoid greasy or spicy foods on days surrounding chemotherapy  o Eat small frequent meals per day contact the triage nurse for further instructions. Skin Care  o Avoid direct sunlight.  o Wear a broad-spectrum sunscreen with an SPF of 30 or higher on any skin exposed to the sun. Re-apply every 2 hours if in the sun and after bathing or sweating.   o F interested in additional information about chemotherapy for yourself or family members: http://www.jara.info/. html    [facility], [facility] and [facility] resources provided.      Safety and Handling of Chemotherapy  Wh hours after taking. A condom should be used during this time. Effective birth control should be used throughout treatment to prevent pregnancy while on these medications and for several months or years after therapy.   Chemotherapy can have harmful side e

## 2017-09-19 NOTE — PROGRESS NOTES
2017  Tomasz Monaco  :  1945  [address] [facility] [address]      Met with Sharyle Argue and family member: sister and presented the patient with the approximate cost of chemotherapy drugs ordered by the oncologist.  The patient was provi

## 2017-09-20 PROBLEM — C79.51 BONE METASTASIS (HCC): Status: ACTIVE | Noted: 2017-01-01

## 2017-09-20 PROBLEM — C79.51 BONE METASTASIS: Status: ACTIVE | Noted: 2017-01-01

## 2017-09-20 NOTE — TELEPHONE ENCOUNTER
Telephoned Fredo Mclaughlin to inform him of his recent bone scan results which show no evidence of lumbar spine involvement for metastatic disease. However, he has left and right rib pain, left pelvic pain, and femur involvement with metastatic disease.  Pt denies any

## 2017-09-21 NOTE — PROGRESS NOTES
LA paper work received from [facility]. Completed and faxed to [facility] per her request at [phone]. Fax completion success received.

## 2017-09-21 NOTE — PROGRESS NOTES
Met with Ra Sanders and his wife Adela Harper to introduce myself as Nurse Navigator and explain my role in Kieran's care. Role of NN explained as providing:  Guidance to healthcare services to ensure continuity and coordination of care.   Education about your diagn

## 2017-09-26 NOTE — PROGRESS NOTES
Pt to infusion for pre-chemo labs. Pt starting C1 D1 Keytruda today. Arrived ambulating independently. Pt has had trach for 9 years. Pt also states he has pleurex dran in L chest for \"pneumonia. \" Has home health RN coming to manage drain.  Port was placed

## 2017-09-26 NOTE — PROGRESS NOTES
Post Chemo Documentation    Patient is here for treatment today, Cycle 1, Day 1 Keytruda & Zometa.       Arrives Ambulating independently      Accompanied by Family member - sister                                 Modifications in dose or schedule: No      V referrals made:  None today. Cancer treatment education, including treatment plan, supportive medications, and post-treatment care, was provided to the patient.     The patient/support person was attentive during education, verbalized understanding, all

## 2017-10-03 NOTE — DISCHARGE SUMMARY
[facility]    PATIENT'S NAME: Kirsty Bakeres   ATTENDING PHYSICIAN: Agustina Iniguez MD   PATIENT ACCOUNT#:   [id number]    LOCATION:  [address] [place] [address]. [address] RECORD #:   [id number]       YOB: 1945  ADMISSION DATE:       0 there was only low concern for occult compressive symptoms. Also had an MRI of the brain done which was found to be negative. No evidence of metastatic lesion was seen.   Once medically cleared, the patient was discharged to home and recommended to follow

## 2017-10-03 NOTE — TELEPHONE ENCOUNTER
Check in day 8 of keytruda. Reports having issue with nausea on day 2 which resolved with compazine. No further episodes with nausea. Does report a decreased appetite. Strategies discussed to increase caloric intake. Energy level fair to good.  Enjoys going

## 2017-10-03 NOTE — TELEPHONE ENCOUNTER
LM inquiring on how he is doing s/p his [facility] last week. I asked him to please call me back with any questions or concerns.

## 2017-10-18 NOTE — PROGRESS NOTES
[facility] Progress Note    Patient Name: Gerda Rowe   YOB: 1945   Medical Record Number: [id number]   CSN: [id number]   Consulting Physician: Easton Olmos MD  Referring Physician(s): Jh Harper MD  Date of Visit: 10/18/2017      bronchial impaction, findings raise suspicion for endobronchial neoplasm with postobstructive left lower lobe pneumonia. There is a moderate left pleural effusion concerning for metastatic in nature.   There are multiple, at least 10, noncalcified right debby immunotherapy. He is here with his wife today who reports he has a 10 pound weight loss which is causing him to have depression. Denies any recurrent fevers or infections, has continued dyspnea complaints.   Regarding his left-sided pleural effusion, mahsa Alcohol use No    Drug use: Unknown     Other Topics Concern   None on file     Social History Narrative   None on file       Allergies:   No Known Allergies    Current Medications:    mirtazapine 15 MG Oral Tab Take 1 tablet (15 mg total) by mouth nightly problems and weakness. +depression    A comprehensive 14 point review of systems was completed. Pertinent positives and negatives noted in the the HPI.      Vital Signs:  /69 (BP Location: Left arm, Patient Position: Sitting, Cuff Size: adult)   Puls 10/18/2017 07:50 AM       Imaging:    Bone scan 9/19/17  CONCLUSION:   Abnormal exam with nodular foci of abnormal uptake in the left 5th rib and right 6th rib as well as in the left iliac crest and proximal left femur compatible with bone metastases.   No will plan for systemic therapy using pembroluzimab every 21 days  --Proceed with cycle 2 of pembroluzimab as he is tolerating treatment well  --Patient is aware goals of therapy are palliative and hopefully in time his cough and pleural effusion will resol

## 2017-10-18 NOTE — PROGRESS NOTES
Post Chemo Documentation    Patient is here for treatment today, Cycle 2, Day 1 Keytruda      Arrives Ambulating independently      Accompanied by Family member                                  Modifications in dose or schedule: No      Verbalizes complain of treatment    Interventions:  monitor for signs/symptoms of infection  optimize nutrition status  chemotherapy teaching  educate regarding self care  encourage activity as tolerated  monitor effect of therapy  monitor lab values  promoted rest  provided

## 2017-10-18 NOTE — PROGRESS NOTES
Pt here for pre-chemo labs, then onc visit, and possible chemo today. He has a trach - room air. His spouse waited in lobby. He denies new complaints/concerns. Port accessed for [employer].  Port accessed per sterile technique, easy blood return an

## 2017-10-24 NOTE — TELEPHONE ENCOUNTER
Dr. Filippo Petty made a referral for patient to be evaluated in the [address]. Today, I called and spoke to the patient who told me that he is not ready to be seen by our service.  He has my contact information and will call for an appoint

## 2017-10-29 PROBLEM — J18.9 COMMUNITY ACQUIRED PNEUMONIA, UNSPECIFIED LATERALITY: Status: ACTIVE | Noted: 2017-01-01

## 2017-10-29 PROBLEM — C34.90 NON-SMALL CELL LUNG CANCER, UNSPECIFIED LATERALITY (HCC): Status: ACTIVE | Noted: 2017-01-01

## 2017-10-29 NOTE — ED NOTES
Patient arrives with complaints of cough that started last night. Per pt, sputum has been \"green and bloody\". Denies CP. Per pt, complains of SOB, but per pt, SOB is his normal and has not increased. Denies fever. Denies any noticeable swelling.  Per pt,

## 2017-10-30 PROBLEM — J44.1 COPD EXACERBATION (HCC): Status: ACTIVE | Noted: 2017-01-01

## 2017-10-30 NOTE — PROGRESS NOTES
[address] [facility]    CDI Prediction Tool Protocol (Vancomycin Initiated)    OVP (oral vancomycin prophylaxis) 125 mg PO BID is being started in this patient based on a score of 14.   This patient is currently at high risk for developing CDI due to his/h

## 2017-10-30 NOTE — CM/SW NOTE
10/30CM-MD orders received in regards to [address]. Per Misty Boston ([id number]) [facility] the Patient is current with their services. MD resumption orders are needed prior to discharge.      -[facility] [id number]

## 2017-10-30 NOTE — ED PROVIDER NOTES
Patient Seen in: [facility] Emergency Department    History   Patient presents with:  Cough    Stated Complaint:     HPI    Patient is a 30-year-old male who presents to the emergency department with a chief complaint of cough and bloody sputum pr Other systems are as noted in HPI. Constitutional and vital signs reviewed. All other systems reviewed and negative except as noted above. PSFH elements reviewed from today and agreed except as otherwise stated in HPI.     Physical Exam   ED Tria ============================================================  ED Course  ------------------------------------------------------------  MDM   Discussed with pulmonary and oncology.   Will admit for IV antibiotics as likely there is some degree of pneum

## 2017-10-30 NOTE — PLAN OF CARE
Diabetes/Glucose Control    • Glucose maintained within prescribed range Progressing        DISCHARGE PLANNING    • Discharge to home or other facility with appropriate resources Progressing        Patient Centered Care    • Patient preferences are identif

## 2017-10-30 NOTE — PLAN OF CARE
Diabetes/Glucose Control    • Glucose maintained within prescribed range Progressing        DISCHARGE PLANNING    • Discharge to home or other facility with appropriate resources Progressing        RESPIRATORY - ADULT    • Achieves optimal ventilation and

## 2017-10-30 NOTE — H&P
[facility]    PATIENT'S NAME: April Rasheed   ATTENDING PHYSICIAN: Daniel Munoz MD   PATIENT ACCOUNT#:   [id number]    LOCATION:  [address] [place] [address] RECORD #:   [id number]       YOB: 1945  ADMISSION DATE:       10/29 at the age of 82; father  in his [de-identified]. SOCIAL HISTORY:  Heavy smoker until . REVIEW OF SYSTEMS:  GENERAL: Tired with headache. EYES: No diplopia or blurred vision. EARS: No tinnitus or impaired hearing. NOSE: No rhinorrhea or epistaxis.   [address] 3. Drain left pleural fluid every 5 days. Dictated By Joni Stewart MD  d: 10/30/2017 14:48:52  t: 10/30/2017 15:12:17  [facility] [id number]  RST/

## 2017-10-30 NOTE — PROGRESS NOTES
[facility]    Progress Note    Randy Felty Patient Status:  Inpatient    1945 MRN [id number]   Location [facility] 4W/SW/SE Attending Samantha Moreno MD   Hosp Day # 1 PCP Fe Seymour MD, MD       Subjective:   Lloyd Fernandez there may be worsening overlying tumor load 3. Reticular nodular infiltrates bilaterally suggestive of miliary metastatic disease.                Assessment and Plan:   Principal Problem:    Non-small cell lung cancer, unspecified laterality (HCC)  Active P

## 2017-10-31 NOTE — DIETARY NOTE
ADULT NUTRITION INITIAL ASSESSMENT    Pt is at moderate nutrition risk. Pt meets malnutrition criteria.       CRITERIA FOR MALNUTRITION DIAGNOSIS:  Criteria for non-severe malnutrition diagnosis: chronic illness related to wt loss 7.5% in 3 months, related 09/11/2007    Stage I, treated with definitive radiation therapy. The patient then relapsed and underwent salvage surgery and reconstruction. • Hyperlipidemia    • Lung cancer ([address]    s/p 2nd cycle of Lexy Coil for new NSCLC    ANTHROPOMETRICS:  HT: 180. 1. 02 0.91   CA 8.3* 8.1*    137   K 4.2 4.2    107   CO2 23 22   OSMOCALC 285 290       NUTRITION RELATED PHYSICAL FINDINGS:  - Body Fat/Muscle Mass: mild muscle and fat depletion per visual exam     - Fluid Accumulation: none noted per visual

## 2017-10-31 NOTE — PROGRESS NOTES
[facility]    Progress Note    Omar Elizalde Patient Status:  Inpatient    1945 MRN [id number]   Location [facility] 4W/SW/SE Attending Yo Infante MD   Hosp Day # 2 PCP Shelton Lanier MD, MD       Subjective:   Narinder So although there may be worsening overlying tumor load 3. Reticular nodular infiltrates bilaterally suggestive of miliary metastatic disease.                Assessment and Plan:   Principal Problem:    Non-small cell lung cancer, unspecified laterality ([facility] [address]

## 2017-10-31 NOTE — PLAN OF CARE
Diabetes/Glucose Control    • Glucose maintained within prescribed range Not Progressing          DISCHARGE PLANNING    • Discharge to home or other facility with appropriate resources Progressing        Patient Centered Care    • Patient preferences are i

## 2017-10-31 NOTE — CONSULTS
Cancer Center Consultation Note    Patient Name: Randy Felty   YOB: 1945   Medical Record Number: [id number]   CSN: [id number]   Consulting Physician: Tho Boudreaux MD  Referring Physician(s): Warren Garcia MD  Date of Consultation: 10/ Comment: Total rhinectomy and flap reconstruction.   05/10/2002: OTHER      Comment: Resection of left true vocal cord nodule  2007: RADIATION THERAPY TREATMENT, ONC (EXT)      Comment: for laryngeal cancer  07/2011: RADIATION THERAPY TREATMENT, ONC (EXT) Resp 18   Ht 1.803 m (5' 11\")   Wt 73.8 kg (162 lb 11.2 oz)   SpO2 95%   BMI 22.69 kg/m²     Physical Examination:    General: Patient is alert and oriented x 3, not in acute distress.   Psych:  Pleasant mood with appropriate questions and responses   YONIS may be worsening overlying tumor load  3. Reticular nodular infiltrates bilaterally suggestive of miliary metastatic disease.         Impression:  80 yo M with metastatic NSCLC complicated by left sided pleural effusion being treated with pembroluzimab immu

## 2017-10-31 NOTE — CM/SW NOTE
10/31- MD orders received in regards to discharge planning [facility] previously noted that a referral was sent to [facility].  This Writer informed  with Andreea ([id number])  [facility] that the orders are in.     -[facility] [id number]

## 2017-11-01 NOTE — SIGNIFICANT EVENT
RRT    *See RRT Documentation Record*    Reason the RRT was called: HR 160S-170s A-Flutter & c/o chest heaviness  Assessment of patient leading up to RRT: -170, pt c/o chest heaviness, mild SOB  Interventions/Testing: Cardizem bolus 5mg, cardizem gtt

## 2017-11-01 NOTE — PLAN OF CARE
Diabetes/Glucose Control    • Glucose maintained within prescribed range  Not   Progressing        DISCHARGE PLANNING    • Discharge to home or other facility with appropriate resources Progressing        Patient Centered Care    • Patient preferences are

## 2017-11-01 NOTE — CM/SW NOTE
Explanation of of BPCI/Medicare program provided. Patient was enrolled under . Patient/family agreed to phone f/u for 3 months from [address] [facility] [address] [facility] [address] after discharge from [address] [place] [address]. BPCI/Medicare letter and brochure provided.     From home     Kamran Camacho RN, C

## 2017-11-01 NOTE — PROGRESS NOTES
Pt seen 1005a to 1041a 36m min critical care time spent on rapid response  dw dr Luther Prasad, [facility] kyler, and nurses  rr afib rate 175 with sbp 100  Started on cardizem drip by dr Luther Prasad and I gave additional 10mg ivp, bp held  ekg showed rr afib and trop neg but mag lo

## 2017-11-01 NOTE — CONSULTS
[facility] HOSP - [facility]    Report of Cardiology Consultation    Treyshelotn Decatur Patient Status:  Inpatient    1945 MRN [id number]   Location [facility] 3W/SW Attending Rosangela Montoya MD   Hosp Day # 3 PCP Joao Cardoso MD, MD     Date of left lower lobe with malignant pleural effusion, diabetes mellitus type 2, and hypertension.     Past Medical History  Past Medical History:   Diagnosis Date   • Cancer of nasal cavity and sinus ([facility] [address] 03/31/2011    Well-differentiated infiltrating squamous Pen (NOVOLOG) 100 UNIT/ML flexpen 1-11 Units 1-11 Units Subcutaneous TID CC and HS   aspirin chewable tab 81 mg 81 mg Oral Daily   atorvastatin (LIPITOR) tab 40 mg 40 mg Oral Nightly   Liraglutide (Victoza) SOPN 1.8 mg 1.8 mg Subcutaneous Daily   magnesium rate 18, height 5' 11\" (1.803 m), weight 162 lb 11.2 oz (73.8 kg), SpO2 94 %.     Scheduled Meds:   • Sodium Chloride       • magnesium oxide  400 mg Oral Once   • MethylPREDNISolone Sodium Succ  40 mg Intravenous Daily   • vancomycin  125 mg Oral BID   • me to participate in the care of your patient.     Minor Mon, [address] [facility] [address] [facility]  Cardiac Electrophysiology  11/1/2017

## 2017-11-01 NOTE — HISTORICAL OFFICE NOTE
Hortensia Gardner  : 1945  ACCOUNT:  [id number]  [phone]  PCP: Dr. Jere Dumas     TODAY'S DATE: 2017  DICTATED BY:  [Alexis Neff MD]    CHIEF COMPLAINT: [Followup of DM, Type II, Followup of History of smoking, Followup of Hyperchole conjunctivae not injected and no xanthelasma. ENT: mucosa pink and moist. NECK: jugular venous pressure not elevated. RESP: respirations with normal rate and rhythm, clear to auscultation. GI: no masses, tenderness or hepatosplenomegaly, rectal deferred.  Ana Sessions 500MG     1 by mouth four times each day           02/26/13 Nystatin              [id number]  as directed                              02/26/13 Pioglitazone HCl      45MG      1 by mouth daily                         02/26/13 Ramipril              10MG -EV Exp 10/01/2016    Medications: *Metoprolol Tartrate, Aspirin EC, Lipitor, MetFORMIN HCl, NexIUM, Nystatin, Pioglitazone HCl, Ramipril.     Per Ordering Physician, Medications Held Prior to Testing: N    Resting EKG: SB, HR 58    0.4mg/5ml of Regad dysfunction). · Mitral valve: Mild regurgitation. · Tricuspid valve: Mild regurgitation. · Pulmonary arteries: PA peak pressure: 32 mm Hg (S).

## 2017-11-01 NOTE — PLAN OF CARE
Arrived to shift, patient HR sustaining in 160s up to 168-170. Resting in bed. Awake and alert and oriented. No symptoms. Other vitals stable. Transfer to [facility] for Cardizem.

## 2017-11-01 NOTE — PROGRESS NOTES
[facility]    Progress Note    Aurora Fink Patient Status:  Inpatient    1945 MRN [id number]   Location [facility] 4W/SW/SE Attending Cristina Mcgregor MD   Hosp Day # 3 PCP Jason Morel MD, MD       Subjective:   Ariana Caraballo cell aplasias (HCC)    Thrombocytosis (HCC)    No sob,tachycardia with rate of 160,start cardizm Iv drip and d/c Toño Luna MD, MD  11/1/2017

## 2017-11-02 NOTE — HOME CARE LIAISON
PATIENT IS ACTIVE WITH [facility]. ORDERS RECEIVED TO RESUME SERVICES UPON DISCHARGE. MET WITH PATIENT, WHO AGREES WITH PLANS.

## 2017-11-02 NOTE — PROGRESS NOTES
[facility] - [facility]    Progress Note    Louis Carmona Patient Status:  Inpatient    1945 MRN [id number]   Location [facility] 3W/SW Attending Guillermo Galindo MD   Hosp Day # 4 PCP Navin Bedoya MD, MD       Assessment and Plan: DilTIAZem HCl  30 mg Oral 4 times per day   • MethylPREDNISolone Sodium Succ  40 mg Intravenous Daily   • vancomycin  125 mg Oral BID   • GuaiFENesin ER  600 mg Oral BID   • Insulin Aspart Pen  1-11 Units Subcutaneous TID CC and HS   • aspirin  81 mg Oral replaced SVT Electronically signed on 11/01/2017 at 17:48 by Jah Sun MD    Ekg 12-lead    Result Date: 11/1/2017  ECG Report  Interpretation  -------------------------- Supraventricular Tachycardia All QRSs narrow and no P waves, - Nonspecific T-abn

## 2017-11-02 NOTE — PROGRESS NOTES
[facility] AND [facility]  Oncology  Progress Note    Mag Cross Patient Status:  Inpatient    1945 MRN [id number]   Location [facility] 3W/SW Attending Vineet Rae MD   Hosp Day # 4 PCP Jimmie Fernández MD, MD     Subjective:  Darleen Rodriguez part of lung     Diabetes 1.5, managed as type 2 ([facility]     Non-small cell lung cancer ([facility]     Bone metastasis ([facility]     Non-small cell lung cancer, unspecified laterality [facility]     Community acquired pneumonia, unspecified laterality     COPD exacerbatio us to take part in the care of this patient. We will continue to monitor during patient's hospital course.     Nir Alexandra MD  [facility]  [address] [place] [address] E.  [address] [place] [address]

## 2017-11-03 NOTE — PROGRESS NOTES
[facility]    Progress Note    Jordi Arellano Patient Status:  Inpatient    1945 MRN [id number]   Location [facility] 3W/SW Attending Jocelyn Whiting MD   Hosp Day # 5 PCP Fredrick Wagner MD, MD       Subjective:   Karma Hatchet Kr significant changes. 4. Healing right sixth rib fracture.                Assessment and Plan:   Principal Problem:    Non-small cell lung cancer, unspecified laterality [facility]  Active Problems:    Non-small cell lung cancer [facility]    Community acquired pneumo

## 2017-11-03 NOTE — PROGRESS NOTES
[facility] HOSP - [facility]    Cardiology Progress Note    Ling Gaucher Patient Status:  Inpatient    1945 MRN [id number]   Location [facility] 3W/SW Attending Earlyne Cockayne, MD   Hosp Day # 5 PCP Ciarra Johnson MD, MD         Assessment a Results:     Lab Results  Component Value Date   WBC 14.9 (H) 11/03/2017   HGB 12.1 (L) 11/03/2017   HCT 36.9 (L) 11/03/2017    (H) 11/03/2017   CREATSERUM 0.95 11/03/2017   BUN 22 (H) 11/03/2017    (L) 11/03/2017   K 4.2 11/03/2017

## 2017-11-03 NOTE — CM/SW NOTE
11/3/17 CM Discharge planning   Pt is current with [facility], advised [address] resume orders on chart and anticipate d/c home later today. [facility].   Alexis Neff [phone] [phone]

## 2017-11-03 NOTE — DISCHARGE SUMMARY
[facility]    PATIENT'S NAME: Alba Zoraida   ATTENDING PHYSICIAN: Deyanira Cespedes MD   PATIENT ACCOUNT#:   [id number]    LOCATION:  [address] [place] [address] RECORD #:   [id number]       YOB: 1945  ADMISSION DATE:       10/29/2 sounds of the left chest with dullness. ABDOMEN:  No hepatosplenomegaly, no ascites, no hernia. EXTREMITIES:  No clubbing of fingers or lower leg edema. LABORATORY DATA:  Electrolytes show sodium 137, potassium 4.2, chloride 107, CO2 of 22, BUN 22.

## 2017-11-06 PROBLEM — I48.92 ATRIAL FLUTTER WITH RAPID VENTRICULAR RESPONSE (HCC): Status: ACTIVE | Noted: 2017-01-01

## 2017-11-06 PROBLEM — J93.9 PNEUMOTHORAX, UNSPECIFIED TYPE: Status: ACTIVE | Noted: 2017-01-01

## 2017-11-06 PROBLEM — A41.9 SEPSIS, DUE TO UNSPECIFIED ORGANISM: Status: ACTIVE | Noted: 2017-01-01

## 2017-11-06 PROBLEM — R06.00 DYSPNEA, UNSPECIFIED TYPE: Status: ACTIVE | Noted: 2017-01-01

## 2017-11-06 PROBLEM — A41.9 SEPSIS (HCC): Status: ACTIVE | Noted: 2017-01-01

## 2017-11-06 NOTE — HISTORICAL OFFICE NOTE
Sasha Saul  : 1945  ACCOUNT:  [id number]  [id number]  PCP: Dr. Dora Anne     TODAY'S DATE: 2017  DICTATED BY:  [Alexis Neff MD]    CHIEF COMPLAINT: [Followup of DM, Type II, Followup of History of smoking, Followup of Hyperchole conjunctivae not injected and no xanthelasma. ENT: mucosa pink and moist. NECK: jugular venous pressure not elevated. RESP: respirations with normal rate and rhythm, clear to auscultation. GI: no masses, tenderness or hepatosplenomegaly, rectal deferred.  Aloha Frieze 500MG     1 by mouth four times each day           02/26/13 Nystatin              [id number]  as directed                              02/26/13 Pioglitazone HCl      45MG      1 by mouth daily                         02/26/13 Ramipril              10MG

## 2017-11-06 NOTE — CONSULTS
[facility] [place]    PATIENT'S NAME: Elena Jolly   ATTENDING PHYSICIAN: Yaneth Cole. Angela Valencia MD   CONSULTING PHYSICIAN: Lionel Mayfield.  Juan Hugo MD   PATIENT ACCOUNT#:   [id number]    LOCATION:  [address] [place] [address] #:   [id number]       DATE OF BIRTH:  [birth year] Well-developed, well-nourished but thin male in no acute distress, alert and oriented x3. VITAL SIGNS:  Blood pressure 93/77; pulse 160, regular; respirations 26, breathing unlabored. He is afebrile. Carotid massage did not affect the rate.   HEENT:  Rem

## 2017-11-06 NOTE — PROGRESS NOTES
[facility]    Progress Note    Kathryn Back Patient Status:  Inpatient    1945 MRN [id number]   Location [facility] 2W/SW Attending Shantelle Gracia MD   Hosp Day # 0 PCP Racheal Matthews MD, MD       Subjective:   Amalia Buckner Fibrillation with Rapid Ventricular Response Kaleb = 90 -Diffuse ST depression + Negative T-waves - Anterolateral ischemia.  ABNORMAL When compared with ECG of 11/01/2017 10:15:51 The previous heart rate was 114 Electronically signed on 11/06/2017 at 12:09 by

## 2017-11-06 NOTE — CONSULTS
Oncology Consult Note    Patient Name: Griselda Read   YOB: 1945   Medical Record Number: [id number]   CSN: [id number]   Consulting Physician: Bereket Cao MD  Referring Physician(s): Rhoda Carpenter MD  Date of Consultation: 11/06/2017     TRUPTI Total rhinectomy and flap reconstruction.   05/10/2002: OTHER      Comment: Resection of left true vocal cord nodule  2007: RADIATION THERAPY TREATMENT, ONC (EXT)      Comment: for laryngeal cancer  07/2011: RADIATION THERAPY TREATMENT, ONC (EXT)      Comme [facility] ) 96.8 °F (36 °C) (Temporal)   Resp 26   Wt 62.8 kg (138 lb 6.4 oz)   SpO2 97%   BMI 19.30 kg/m²     Physical Examination:    General: Patient is alert and oriented x 3, not in acute distress.   Psych:  Calm with appropriate questions and responses   YONIS pleural effusion being treated with pembroluzimab immunotherapy admitted with atrial fibrillation with RVR in the setting of hypotension, increased lactic acidosis and leukocytosis concerning for sepsis      1.) Afib with RVR    --Patient seen and evaluate seems like it might be hemoconcentrated likely 2/2 dehydration  --Would recommend transfusion of pRBC for hgb values <7 g/dL    6.) REDD    --Serum creatinine up to 1.25 in the setting on hyponatremia and elevated hgb; appears to be secondary to prerenal az

## 2017-11-06 NOTE — ED PROVIDER NOTES
Patient Seen in: [facility] AND [facility] Emergency Department    History   Patient presents with:  Dyspnea ROSY SOB (respiratory)    Stated Complaint:     HPI    This patient presents to the emergency department with past medical history which is significant fo cavity cancer    Medications :   DilTIAZem HCl ER Coated Beads 120 MG Oral Capsule SR 24 Hr,  Take 1 capsule (120 mg total) by mouth daily.    ipratropium-albuterol 0.5-2.5 (3) MG/3ML Inhalation Solution,  Take 3 mL by nebulization every 6 (six) hours as ne as otherwise stated in HPI.     Physical Exam   ED Triage Vitals  BP: (!) 87/67 [11/06/17 0955]  Pulse: 153 [11/06/17 0955]  Resp: 24 [11/06/17 0955]  Temp: 97.6 °F (36.4 °C) [11/06/17 1037]  Temp src: Oral [11/06/17 1037]  SpO2: 97 % [11/06/17 0955]  [facility]. Family was updated he had multiple re-evaluations blood pressure did improve up to 98 systolic we will hold off on Cardizem with his blood pressure being low Dr. Art Hendrix requested IV digoxin 0.25.     I spent a total of 45 minutes of critical care EKG    Rate, intervals and axes as noted on EKG Report.   Rate: EKG shows rate 155 regular rhythm unclear at this time if it is sinus or if it is an underlying atrial rhythm I favor atrial flutter based on rhythm strip which shows flutter pattern

## 2017-11-06 NOTE — PROGRESS NOTES
Merrem (meropenem) 1264 mg IV once was ordered for [employer]. There is no height or weight on file to calculate BMI.   Wt Readings from Last 6 Encounters:  11/03/17 : 139 lb 6.4 oz  10/18/17 : 154 lb 6.4 oz  09/15/17 : 165 lb  09/13/17 : 164 lb

## 2017-11-06 NOTE — ED INITIAL ASSESSMENT (HPI)
C/o SOB and \"mild\" chest discomfort. Pt was d/c from hospital Sunday after being treated for pneumonia. Dressing on left side of chest from draining fluids from lungs today. Pt has trach. Pt c/o increasing white secretions.

## 2017-11-07 NOTE — DIETARY NOTE
ADULT NUTRITION INITIAL ASSESSMENT    Pt is at high nutrition risk. Pt meets malnutrition criteria.       CRITERIA FOR MALNUTRITION DIAGNOSIS:  Criteria for severe malnutrition diagnosis: chronic illness related to wt loss greater than 7.5% in 3 months, re take anti-nausea meds. Encouraged use of antinausea meds. Educated and provided dietary handouts from diet manual on oncology nutrition and suggestions for weight gain with limiting concentrated sweets and sugar. Sample recipes provided and discussed.  Wife oz)  10/18/17 : 70 kg (154 lb 6.4 oz)  09/15/17 : 74.8 kg (165 lb)  09/13/17 : 74.4 kg (164 lb)  08/28/17 : 75.6 kg (166 lb 11.2 oz)  07/15/17   : 180 lbs      GASTROINTESTINAL PROBLEMS: nausea    FOOD/NUTRITION RELATED HISTORY:  Appetite: poor to fair but PRESCRIPTION:  Diet: general (pt is limiting sweets as discussed)  Oral Supplements sugar-free shakes and double strength milk  Estimated Nutritional Needs:    Calories: 7880-5114 calories/day (30-35 calories per kg)  Protein: 75-95 grams protein/day (1.2-

## 2017-11-07 NOTE — PROGRESS NOTES
[facility]  Hematology/Oncology  Progress Note    Alvin Bernheim Patient Status:  Inpatient    1945 MRN [id number]   Location [facility] 2W/SW Attending Hiram Bush MD   Hosp Day # 1 PCP Lorena Raya MD, MD     Subjective:  Rajani Ly exacerbation ([facility]     Community acquired pneumonia     Weight loss     Other acquired pure red cell aplasias (HCC)     Thrombocytosis (HCC)     Sepsis (HCC)     Sepsis, due to unspecified organism [facility])     Atrial flutter with rapid ventricular response ( improving with IVF   --Thrombocytosis is now resolved; likely reactive secondary to acute inflammatory process/infection, will continue to monitor  --Sepsis source unclear at this time, but pt's blood counts appear to be responding favorably to IV abx

## 2017-11-07 NOTE — PROGRESS NOTES
1600: Patient hypotensive SBP 72 and tachycardic HR in the 160s. Paged [place] [facility] APN who contacted Dr. Ajit Villalobos. Orders for 200 cc bolus and digoxin Iv given see MAR.  1730: Started back on cardizem drip 5 mg/hr, APN aware.  Started on 3L 28% FiO2 trach c

## 2017-11-07 NOTE — PROGRESS NOTES
[facility]    Progress Note    Joe Must Patient Status:  Inpatient    1945 MRN [id number]   Location [facility] 2W/SW Attending Jack Zacarias MD   Hosp Day # 1 PCP Yeyo Gonzalez MD, MD       Subjective:   Eva Salguero Atrial Fibrillation with Rapid Ventricular Response Kaleb = 90 -Diffuse ST depression + Negative T-waves - Anterolateral ischemia.  ABNORMAL When compared with ECG of 11/01/2017 10:15:51 The previous heart rate was 114 Electronically signed on 11/06/2017 at 1

## 2017-11-07 NOTE — PROGRESS NOTES
Cardizem drip turned off and SBP 70-80s, now improved to low 90s, patient asymptomatic. HR now increased to 130s, up to 160s at times, also low urine output per RN. Patient denies chest pain, SOB, palpitations.  Discussed with Dr. Vail Quiet and will give 200ml

## 2017-11-07 NOTE — H&P
[facility]    PATIENT'S NAME: Luz Marianancie Chente   ATTENDING PHYSICIAN: Madisno Garzon.  Siri Gamboa MD   PATIENT ACCOUNT#:   [id number]    LOCATION:  [address] [place] [address] #:   [id number]       YOB: 1945  ADMISSION DATE:       11/06/2017 mirtazapine 15 mg a day, Prilosec 20 mg a day, Lipitor 40 mg at bedtime, Victoza 18 mg daily, magnesium oxide 500 mg a day, aspirin 81 mg a day, Actos 45 mg a day, ramipril 10 mg a day, metformin 1000 mg twice a day.     SOCIAL HISTORY:  Heavy smoker until fibrillation. 2.   Stage IV bronchogenic carcinoma, left lower lobe, with metastasis. 3.   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   4.   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  5

## 2017-11-07 NOTE — CM/SW NOTE
CTL update from RN regarding progression of care and discharge plans. Patient is a  male recently admitted here from 10/29 to 11/3: DX: Lung cancer, Laryngeal and nasal cancer; History:  Diabetes, COPD, Pneumonia.   He was discharged home with [facility]

## 2017-11-07 NOTE — PROGRESS NOTES
[facility] CLINICS  Progress Note    Blayne Parcel Patient Status:  Inpatient    1945 MRN [id number]   Location [facility] 2W/SW Attending Sheila Mcgarry MD   Hosp Day # 1 PCP Christin Jimenez MD, MD     Assessment:    1.  Atrial fib/fluter with R CREATSERUM 1.25 11/06/2017    11/06/2017   CA 9.2 11/06/2017          Lab Results  Component Value Date   TROP 0.02 11/06/2017   TROP 0.00 11/01/2017   TROP 0.01 11/01/2017        Medications:    • meropenem  500 mg Intravenous Q8H   • [START ON 1

## 2017-11-07 NOTE — PROGRESS NOTES
[facility] dosing service    Initial Pharmacokinetic Consult for Vancomycin Dosing     Pastora Gannon is a 81year old male admitted on 11/7 who is being treated for sepsis.   Pharmacy has been asked to dose Vancomycin by Dr. Barrett Hoffman    He has No Known Al continue to follow him. We appreciate the opportunity to assist in his care.     Anitha Burrell, PharmD  11/7/2017  8:41 AM  [facility] Extension: [phone]

## 2017-11-08 NOTE — OCCUPATIONAL THERAPY NOTE
OCCUPATIONAL THERAPY EVALUATION - INPATIENT     Room Number: 216/216-A  Evaluation Date: 11/8/2017  Type of Evaluation: Initial  Presenting Problem:  (sepsis with h/o lung CA with bone mets)    Physician Order: IP Consult to Occupational Therapy  Reason fo Medical History  Past Medical History:   Diagnosis Date   • Cancer of nasal cavity and sinus ([facility] 03/31/2011    Well-differentiated infiltrating squamous cell carcinoma.   The patient [place] [facility] with [place] and adj breaks    COGNITION  Alert and following commands    Communication: pt able to speak in a hoarse voice    RANGE OF MOTION   Upper extremity ROM is within functional limits  STRENGTH ASSESSMENT  Upper extremity strength is within functional limits     ACTIV 1x  Comment:          Goals  on: 11/15/17  Frequency: 3-5x/week

## 2017-11-08 NOTE — PROGRESS NOTES
[facility]    Cardiology Progress Note    Jose Luis Safe Patient Status:  Inpatient    1945 MRN [id number]   Location [facility] 2W/SW Attending Bibi Yates MD   Hosp Day # 2 PCP Mariela Hernandez MD, MD         Assessment and P Sodium  40 mg Oral QAM AC   • Pioglitazone HCl  45 mg Oral Daily   • ramipril  10 mg Oral Daily   • Rivaroxaban  20 mg Oral Daily with food         Results:     Lab Results  Component Value Date   WBC 15.8 (H) 11/08/2017   HGB 10.2 (L) 11/08/2017   HCT 31.

## 2017-11-08 NOTE — CM/SW NOTE
NI met with patient re recommendations for HHC vs. Rehab. Patient would like to return home with [facility] and is declining rehab. NI updated [facility], resumption orders needed.     Jennifer Preston [facility]  [id number]

## 2017-11-08 NOTE — PROGRESS NOTES
[facility]    Progress Note    Tony Thomson Patient Status:  Inpatient    1945 MRN [id number]   Location [facility] 2W/SW Attending Lior Copeland MD   Hosp Day # 2 PCP Uziel Hay MD, MD       Subjective:   Guanako Yang 11/6/2017  ECG Report  Interpretation  -------------------------- Atrial Fibrillation with Rapid Ventricular Response Kaleb = 90 -Diffuse ST depression + Negative T-waves - Anterolateral ischemia.  ABNORMAL When compared with ECG of 11/01/2017 10:15:51 The pr

## 2017-11-08 NOTE — PHYSICAL THERAPY NOTE
PHYSICAL THERAPY EVALUATION - INPATIENT     Room Number: 216/216-A  Evaluation Date: 11/8/2017  Type of Evaluation: Initial  Physician Order: PT Eval and Treat    Presenting Problem: sepsis  Reason for Therapy: Mobility Dysfunction and Discharge Planni adenocarcinoma of the lower lobe bronchus.  Pt also has A-fib    Problem List  Principal Problem:    Sepsis, due to unspecified organism [facility])  Active Problems:    Non-small cell lung cancer ([facility]    Sepsis ([facility]    Atrial flutter with rapid ventricular res COGNITION  · Overall Cognitive Status:  WFL - within functional limits  · Orientation Level:  oriented x4    RANGE OF MOTION AND STRENGTH ASSESSMENT  Upper extremity ROM and strength are within functional limits     Lower extremity ROM is within functi met;Call light within reach;RN aware of session/findings; All patient questions and concerns addressed    CURRENT GOALS    Goals to be met by: 11/15/17  Patient Goal Patient's self-stated goal is: to go home   Goal #1 Patient is able to demonstrate supine -

## 2017-11-09 NOTE — PROGRESS NOTES
[facility] AND [facility]  Hematology/Oncology  Progress Note    Thor Gone Patient Status:  Inpatient    1945 MRN [id number]   Location [facility] 2W/SW Attending Rome Marquez MD   Hosp Day # 2 PCP Marcia Clinton MD, MD     Subjective:  Eduarda Watts Thrombocytosis (HCC)     Sepsis ([facility]     Sepsis, due to unspecified organism [facility])     Atrial flutter with rapid ventricular response (HCC)     Pneumothorax, unspecified type     Dyspnea, unspecified type  Impression:  82 yo M with metastatic NSCLC compli unclear at this time, but pt's blood counts appear to be responding favorably to IV abx; WBC continues to trend down today     5.) Anemia     --Hgb is typically in the 11-12 g/dL range, will continue to monitor  --Would recommend transfusion of pRBC for hg

## 2017-11-09 NOTE — PROGRESS NOTES
[facility]    Progress Note    Louis Carmona Patient Status:  Inpatient    1945 MRN [id number]   Location [facility] 3W/SW Attending Erik Medel MD   Hosp Day # 3 PCP Navin Bedoya MD, MD       Subjective:   Jayla Goodman or tenderness in the calves or thighs  Neurologic: Grossly normal    Assessment and Plan:     Sepsis, due to unspecified organism [facility])  Continue current antibiotics      Non-small cell lung cancer ([address]  Pleurx drainage      Sepsis ([address]  Resolved      At

## 2017-11-09 NOTE — PROGRESS NOTES
Reported called to [facility]. Patient connected to tele monitoring. All belongings sent with patient and transport.

## 2017-11-09 NOTE — PHYSICAL THERAPY NOTE
Pt was to be seen for PT treatment session. Attempted to see pt twice. The first time pt refused because he had just eaten and wanted to rest. Pt requested that PT return later in the afternoon.  Attempted to see pt for the second time but he refused once a

## 2017-11-09 NOTE — PROGRESS NOTES
[facility]    Cardiology Progress Note    Pastora Rudy Patient Status:  Inpatient    1945 MRN [id number]   Location [facility] 3W/SW Attending Nata Guillaume MD   Hosp Day # 3 PCP Joseph Hernandez MD, MD         Assessment and P mirtazapine  15 mg Oral Nightly   • Pantoprazole Sodium  40 mg Oral QAM AC   • Pioglitazone HCl  45 mg Oral Daily   • ramipril  10 mg Oral Daily   • Rivaroxaban  20 mg Oral Daily with food         Results:     Lab Results  Component Value Date   WBC 14.4 (

## 2017-11-10 NOTE — PROGRESS NOTES
[facility]  Hematology/Oncology  Progress Note    Silvia Duran Patient Status:  Inpatient    1945 MRN [id number]   Location [facility] 2W/SW Attending Chivo Can MD   Hosp Day # 4 PCP Salvador Isabel MD, MD     Subjective:  Don Box lung cancer, unspecified laterality ([facility]     Community acquired pneumonia, unspecified laterality     COPD exacerbation ([facility]     Community acquired pneumonia     Weight loss     Other acquired pure red cell aplasias ([facility]     Thrombocytosis ([facility]     Sep remind Johnny Teixeira of his prn anti-emetic; compazine     4.) Leukocytosis and thrombocytosis/Sepsis     --Agree with management and treatment recommendations per Dr. Enrrique Jean Baptiste in pulmonary.  Thank you for your continued care and management of this patient  --Pt tx with va

## 2017-11-10 NOTE — OCCUPATIONAL THERAPY NOTE
OCCUPATIONAL THERAPY TREATMENT NOTE - INPATIENT     Room Number: 129/195-T          Presenting Problem:  (sepsis with h/o lung CA with bone mets)    Problem List  Principal Problem:    Sepsis, due to unspecified organism [facility])  Active Problems:    Non-smal Form  How much help from another person does the patient currently need…  -   Putting on and taking off regular lower body clothing?: None  -   Bathing (including washing, rinsing, drying)?: None  -   Toileting, which includes using toilet, bedpan or urina

## 2017-11-10 NOTE — HOME CARE LIAISON
ORDERS RECEIVED TO [address] [place] [address]. MET WITH PATIENT AND HIS WIFE, WHO VERIFIED AGREEMENT WITH PLANS. [facility] WILL FOLLOW UP WITH PATIENT AFTER DISCHARGE.

## 2017-11-10 NOTE — CM/SW NOTE
11/10-Joaquin form [address] informed this Writer that will will be filling the prescription for the nebulizer. This Writer met with the Patient and his wife at bedside and informed them of the above.  The Patien's wife stated that she will be picking

## 2017-11-10 NOTE — PROGRESS NOTES
[facility]  Hematology/Oncology  Progress Note    Skippy Arsenio Patient Status:  Inpatient    1945 MRN [id number]   Location [facility] 2W/SW Attending Radha Bey MD   Hosp Day # 3 PCP Cristhian Hernandez MD, MD     Subjective:  Mervin Bonilla Thrombocytosis (HCC)     Sepsis ([facility]     Sepsis, due to unspecified organism [facility])     Atrial flutter with rapid ventricular response (HCC)     Pneumothorax, unspecified type     Dyspnea, unspecified type  Impression:  80 yo M with metastatic NSCLC compli likely reactive secondary to acute inflammatory process/infection, will continue to monitor  --Sepsis source unclear at this time, but pt's blood counts appear to be responding favorably to IV abx; WBC continues to trend down today     5.) Anemia     --Hgb

## 2017-11-10 NOTE — PLAN OF CARE
Skin integrity remains intact Progressing      Absence of fever/infection during anticipated neutropenic period Progressing      Patient/Family Short Term Goal Progressing      Patient/Family Long Term Goal Progressing      Patient preferences are identifi

## 2017-11-10 NOTE — PROGRESS NOTES
120 [facility] dosing service    Follow-up Pharmacokinetic Consult for Vancomycin Dosing     Foster Lawrence is a 81year old male admitted on 11/7 who is being treated for sepsis. Patient is on day 4 of Vancomycin 1 gm IV Q 24 hours.   Goal trough is 1 on Vancomycin to assess renal function. 4.  Pharmacy will follow and monitor renal function changes, toxicity and efficacy.     Alan De La Rosa, PharmD  11/10/2017  6:12 AM  Jamil  Pharmacy Extension: [phone]

## 2017-11-10 NOTE — PROGRESS NOTES
[facility]    Progress Note    Domenica Rangel Patient Status:  Inpatient    1945 MRN [id number]   Location [facility] 3W/SW Attending Reji Pfeiffer MD   Hosp Day # 4 PCP Joao Cardoso MD, MD       Subjective:   Gary White non-tender; bowel sounds normal  Extremities: no edema, redness or tenderness in the calves or thighs  Neurologic: Grossly normal    Assessment and Plan:     Sepsis, due to unspecified organism [facility])  Resolving      [facility] ([facility]  Chroni

## 2017-11-11 NOTE — PHYSICAL THERAPY NOTE
PHYSICAL THERAPY TREATMENT NOTE - INPATIENT    Room Number: 105/169-A       Presenting Problem: sepsis    Problem List  Principal Problem:    Sepsis, due to unspecified organism [facility])  Active Problems:    [facility] cell lung [facility]    Sepsis ([facility] -   Moving to and from a bed to a chair (including a wheelchair)?: A Little   -   Need to walk in hospital room?: A Little   -   Climbing 3-5 steps with a railing?: A Little    AM-PAC Score:  Raw Score: 18   PT Approx Degree of Impairment Score: 46.58%

## 2017-11-11 NOTE — PROGRESS NOTES
[facility]    Progress Note    Randy Felty Patient Status:  Inpatient    1945 MRN [id number]   Location [facility] 3W/SW Attending Jayla Pak MD   Hosp Day # 5 PCP Fe Seymour MD, MD       Subjective:   Vandana Albert edema, redness or tenderness in the calves or thighs  Neurologic: Grossly normal    Assessment and Plan:     Sepsis, due to unspecified organism [facility])  Resolved      Non-small cell lung cancer [facility])  Oncology follows      Sepsis ([address]  Continue intravenous

## 2017-11-12 NOTE — PROGRESS NOTES
120 [facility] dosing service    Follow-up Pharmacokinetic Consult for Vancomycin Dosing     Gerda Rowe is a 81year old male admitted on 11/7 who is being treated for sepsis. Patient is on day 5 of Vancomycin 1 gm IV Q 12 hours.   Goal trough is 1 renal function. 4.  Pharmacy will follow and monitor renal function changes, toxicity and efficacy.     Celestina Fields, PharmD  11/11/2017  9:13 PM  [address] Extension: [phone]

## 2017-11-12 NOTE — PLAN OF CARE
Maintains optimal cardiac output and hemodynamic stability Progressing      Absence of cardiac arrhythmias or at baseline Progressing      Glucose maintained within prescribed range Progressing      Patient preferences are identified and integrated in the

## 2017-11-12 NOTE — PROGRESS NOTES
[facility]    Progress Note    Alen Shannon Patient Status:  Inpatient    1945 MRN [id number]   Location [facility] 3W/SW Attending García Varma MD   Hosp Day # 6 PCP Bacilio Godwin MD, MD       Subjective:   Ainsworth Ill non-tender; bowel sounds normal  Extremities: no edema, redness or tenderness in the calves or thighs  Neurologic: Grossly normal    Assessment and Plan:     Sepsis, due to unspecified organism ([address]  On IV antibiotics with meropenem and vancomycin      No

## 2017-11-13 NOTE — DIETARY NOTE
ADULT NUTRITION REASSESSMENT    Pt is at high nutrition risk. Pt meets malnutrition criteria.       CRITERIA FOR MALNUTRITION DIAGNOSIS:  Criteria for severe malnutrition diagnosis: chronic illness related to wt loss greater than 7.5% in 3 months, related better. C/O nausea before 2 meals since here but did not take anti-nausea meds. Encouraged use of antinausea meds.  Educated and provided dietary handouts from diet manual on oncology nutrition and suggestions for weight gain with limiting concentrated swee Now 92% IBW--but masked by edema  Usual Body Wt: 180 lbs       Now 91% UBW-masked by edema  WEIGHT HISTORY:  Patient Weight(s) for the past 336 hrs:   Weight   11/13/17 0455 74.1 kg (163 lb 6.4 oz)   11/12/17 0454 74.4 kg (164 lb 1.6 oz)   11/11/17 0453 73 Intake:      Monitor: adequacy of PO intake, tolerance of PO intake, adequacy of supplement intake and tolerance of supplement intake.   - Anthropometric Measurement:      Monitor: wt and wt change   - Nutrition Goals:      gradual wt gain as able, true wt

## 2017-11-13 NOTE — PROGRESS NOTES
[facility] AND [facility]  Hematology/Oncology  Progress Note    Ed Hernandez Patient Status:  Inpatient    1945 MRN [id number]   Location [facility] 2W/SW Attending Ashutosh Monahan MD   Hosp Day # 7 PCP Carolina Ty MD, MD     Subjective:  Danielle Ormond red cell aplasias (HCC)     Thrombocytosis (HCC)     Sepsis (HCC)     Sepsis, due to unspecified organism [facility])     Atrial flutter with rapid ventricular response (HCC)     Pneumothorax, unspecified type     Dyspnea, unspecified type  Impression:  82 yo M NGTD  --Thrombocytosis is now resolved; likely reactive secondary to acute inflammatory process/infection, will continue to monitor  --Sepsis source unclear at this time, but pt's blood counts appear to be responding favorably to IV abx; WBC continues to t

## 2017-11-13 NOTE — PROGRESS NOTES
[facility]    Progress Note    Joe Must Patient Status:  Inpatient    1945 MRN [id number]   Location [facility] 3W/SW Attending Jack Zacarias MD   Hosp Day # 7 PCP Yeyo Gonzalez MD, MD       Subjective:   Eva Salguero diminished breath sounds bilaterally.   Cardiovascular: S1, S2 normal, regular rate and rhythm  Abdominal: soft, non-tender; bowel sounds normal  Extremities: no edema, redness or tenderness in the calves or thighs  Neurologic: Grossly normal    Assessment

## 2017-11-14 NOTE — PHYSICAL THERAPY NOTE
PHYSICAL THERAPY TREATMENT NOTE - INPATIENT    Room Number: 515/553-P       Presenting Problem: sepsis    Problem List  Principal Problem:    Sepsis, due to unspecified organism [facility])  Active Problems:    Non-small cell lung cancer ([facility]    Sepsis ([facility] Normal           Static Standing: Fair +  Dynamic Standing: Fair    ACTIVITY TOLERANCE--POOR   Resting bP  146/99  HR  94  O2 sats  93 % RA   With ctivity  O2 sats  89-91 %   HR    89    BP    143/75  ( 96 )   AM-PAC '6-Clicks' INPATIENT SHORT FORM - BASIC supine - sit EOB @ level: independent   Goal #1   Current Status Mod I   Goal #2 Patient is able to demonstrate transfers Sit to/from [place] assistance level: independent with walker - rolling   Goal #2  Current Status SBA/mod I   Goal #3 Patient is able

## 2017-11-14 NOTE — PROGRESS NOTES
[facility] AND [facility]  Hematology/Oncology  Progress Note    Sergei Scale Patient Status:  Inpatient    1945 MRN [id number]   Location [facility] 2W/SW Attending Arlene Samayoa MD   Hosp Day # 8 PCP Melody Terry MD, MD     Subjective:  Jaylan Abraham unspecified laterality     COPD exacerbation ([facility]     Community acquired pneumonia     Weight loss     Other acquired pure red cell aplasias (HCC)     Thrombocytosis (HCC)     Sepsis ([facility]     Sepsis, due to unspecified organism [facility])     Atrial flutter w management of this patient  --Pt tx with vanc & shanelle on admission, following blood cultures drawn on admission   --Blood cultures are NGTD  --Thrombocytosis is now resolved; likely reactive secondary to acute inflammatory process/infection, will continue t

## 2017-11-15 NOTE — PLAN OF CARE
Problem: Patient Centered Care  Goal: Patient preferences are identified and integrated in the patient's plan of care  Interventions:  - What would you like us to know as we care for you?   - Provide timely, complete, and accurate information to patient/fa Care Plan goals for specific interventions    Outcome: Progressing  Pt on PO cardizem and amiodarone. No calls from tele, no complaints of palpitation.     Problem: CARDIOVASCULAR - ADULT  Goal: Maintains optimal cardiac output and hemodynamic stability  IN insulin given. Pt did not have much of an appetite today but was encouraged to eat. Had breakfast and family brought him a hot dog for dinner. Home anti-DM medicine self-administered by pt.      Problem: SKIN/TISSUE INTEGRITY - ADULT  Goal: Skin integrity r

## 2017-11-15 NOTE — PROGRESS NOTES
[facility] AND [facility]  Hematology/Oncology  Progress Note    Ed Hernandez Patient Status:  Inpatient    1945 MRN [id number]   Location [facility] 2W/SW Attending Ashutosh Monahan MD   Hosp Day # 9 PCP Carolina Ty MD, MD     Subjective:  Danielle Ormond Metastatic mediastinal lymphadenopathy has slightly worsened. 5. Osseous metastatic disease with lytic expansile changes in the right sixth rib. 6. Suspect left adrenal metastatic disease. Medications reviewed.     Assessment and Plan:  Patient A progression where lesions appear to be increasing in size on immunotherapy prior to them improving and smaller with further treatment    --Patient sating well on room air without signs of pulmonary compromise   --Patient will plan to follow-up regarding hi continue to monitor during patient's hospital course.     Violeta Rangel MD  [facility]  [address] [address]  [address] [place] [address], [facility]

## 2017-11-15 NOTE — PROGRESS NOTES
[facility]    Progress Note    Carlton Vo Patient Status:  Inpatient    1945 MRN [id number]   Location [facility] 3W/SW Attending Modesto Lau MD   Hosp Day # 8 PCP Randall Tsai MD, MD       Subjective:   Arthur Ramey Normocephalic, without obvious abnormality   Neck: no JVD and supple, symmetrical, trachea midline  Pulmonary:  diminished breath sounds bilaterally, more to the left.   Cardiovascular: S1, S2 normal, regular rate and rhythm  Abdominal: soft, non-tender; peace with occluded left lower lobe bronchus. There is also worsening subtotal masslike consolidation of the right upper lobe. Aerated portion of the left lung demonstrates lymphangitic carcinomatosis and diffuse metastatic nodularity.   2. Loculated moderate to

## 2017-11-15 NOTE — PHYSICAL THERAPY NOTE
Attempted physical therapy treatment. Patient presented sitting edge of bed.  Reports being fatigued as he had already ambulated three times today and will go for procedure later this PM. Patient declined physical therapy but does acknowledge need for thera

## 2017-11-15 NOTE — PROGRESS NOTES
[facility]    Progress Note    Sergei Scale Patient Status:  Inpatient    1945 MRN [id number]   Location [facility] 3W/SW Attending Arlene Samayoa MD   Hosp Day # 9 PCP Melody Terry MD, MD       Subjective:   Skyler Coronel lymphangitic carcinomatosis and diffuse metastatic nodularity.   2. Loculated moderate to large left pleural effusion containing a few foci of gas which may reflect infection/empyema or Pleurx catheter related/iatrogenically introduced gas in the pleural sp

## 2017-11-16 NOTE — OPERATIVE REPORT
[facility]    PATIENT'S NAME: Elena Trujilloon   ATTENDING PHYSICIAN: Yaneth Cole. Angela Valencia MD   OPERATING PHYSICIAN: Yaneth Cole.  Angela Valencia MD   PATIENT ACCOUNT#:   [id number]    LOCATION:  [facility] ROOM 4 [facility] 10  MEDICAL RECORD #:   [id number]       D

## 2017-11-16 NOTE — BRIEF OP NOTE
[facility] ENDOSCOPY LAB SUITES  Brief Op Note     Alcidesalli Moralez Patient Status:  Inpatient    1945 MRN [id number]   Location [facility] ENDOSCOPY LAB SUITES Attending Shanthi Ferrari MD   Hosp Day # 10 PCP Blanche Werner MD, MD     Proc

## 2017-11-16 NOTE — PROGRESS NOTES
[facility] HOSP - [facility]    Progress Note    Alvin Bernheim Patient Status:  Inpatient    1945 MRN [id number]   Location [facility] ENDOSCOPY LAB SUITES Attending Hiram Bush MD   Hosp Day # 10 PCP Lorena Raya MD, MD       Subjective: consolidation of the right upper lobe. Aerated portion of the left lung demonstrates lymphangitic carcinomatosis and diffuse metastatic nodularity.   2. Loculated moderate to large left pleural effusion containing a few foci of gas which may reflect infecti

## 2017-11-16 NOTE — PROGRESS NOTES
[facility]  Hematology/Oncology  Progress Note    Masury Elder Patient Status:  Inpatient    1945 MRN [id number]   Location [facility] 2W/SW Attending Alicia Travis MD   Hosp Day # 10 PCP Toño Aj MD, MD     Subjective:  Kendrick Winters left sided pleural effusion being treated with pembroluzimab immunotherapy admitted with atrial flutter with RVR in the setting of hypotension, increased lactic acidosis and leukocytosis concerning for sepsis        1.) Atrial Flutter with RVR     --Jenny and given protein and milk supplements. -- PT has prn compazine for nausea     4.) Leukocytosis and thrombocytosis/Sepsis     --Agree with management and treatment recommendations per Dr. Hari Ovalle in pulmonary.  Thank you for your continued care and manageme

## 2017-11-17 NOTE — PHYSICAL THERAPY NOTE
PHYSICAL THERAPY TREATMENT NOTE - INPATIENT    Room Number: 230/580-G       Presenting Problem: sepsis    Problem List  Principal Problem:    Sepsis, due to unspecified organism [facility])  Active Problems:    Non-small cell lung cancer ([facility]    Sepsis ([facility] Normal           Static Standing: Good  Dynamic Standing: Fair +    AM-PAC '6-Clicks' INPATIENT SHORT FORM - BASIC MOBILITY  How much difficulty does the patient currently have. ..  -   Turning over in bed (including adjusting bedclothes, sheets and blanket Current Status  SBA x 6 steps with one handrail and verbal cues for single step approach   Goal #5 Patient to demonstrate independence with home activity/exercise instructions provided to patient in preparation for discharge.    Goal #5   Current Status I

## 2017-11-17 NOTE — CM/SW NOTE
Patient with need for possible home oxygen per trach. Referral to Karen Mcgraw. 97. ([phone]).  Order has been signed but percent oxygen portion still needs to be filled out - RN states this is still being adjusted and is aware this will need to be addressed

## 2017-11-17 NOTE — PROGRESS NOTES
[facility]    Progress Note    Pastora Shungnak Patient Status:  Inpatient    1945 MRN [id number]   Location [facility] 3W/SW Attending Nata Guillaume MD   Hosp Day # 11 PCP Joseph Hernandez MD, MD       Subjective:   Juanita Pulido opacities in the peripheral aspect of the right lung which there are present areas of edema or atelectasis.              Assessment and Plan:   Principal Problem:    Sepsis, due to unspecified organism [facility])  Active Problems:    Non-small cell lung cancer (

## 2017-11-18 NOTE — PLAN OF CARE
Problem: CARDIOVASCULAR - ADULT  Goal: Maintains optimal cardiac output and hemodynamic stability  INTERVENTIONS:  - Monitor vital signs, rhythm, and trends  - Monitor for bleeding, hypotension and signs of decreased cardiac output  - Evaluate effectivenes respiratory  Lasix Po started  Pleurex to be drained tomorrow  Continue IV ABX as orederd  Pt eating more meals - Zofran PRN given

## 2017-11-18 NOTE — PROGRESS NOTES
[facility]  Hematology/Oncology  Progress Note    Jose Luis Safe Patient Status:  Inpatient    1945 MRN [id number]   Location [facility] 2W/SW Attending Bibi Yates MD   Hosp Day # 12 PCP Mariela Hernandez MD, MD     Subjective:  Mendel Rand mucous plug. Medications reviewed.     Assessment and Plan:  Patient Active Problem List:     Pleural effusion     Community acquired pneumonia of left lung, unspecified part of lung     Diabetes 1.5, managed as type 2 ([facility]     Non-small cell lung cance scan with our radiologist has not identified any new sites of disease only slight flare of known disease; consistent pattern expected with pseudo-progression with recent treatment from immunotherapy, suspect these areas will later decrease in size with fur bronchoscopy recently removed significant mucous plug,   --Pt is being treated with diuretics with evidence of LE swelling; CXR concerning for continued opacification.  Pt is being treated with diuretics until weight decreases       Thank you for allowing u

## 2017-11-18 NOTE — PROGRESS NOTES
120 [facility] dosing service    Follow-up Pharmacokinetic Consult for Vancomycin Dosing     Blayne Quach is a 81year old male admitted on 11/6 who is being treated for sepsis. Patient is on day 12 of Vancomycin 1 gm IV Q 12 hours.   Goal trough is Pharmacy will follow and monitor renal function changes, toxicity and efficacy.     Austin Mcnulty, PharmD  11/18/2017  2:26 PM  [address] Extension: [phone]

## 2017-11-18 NOTE — PROGRESS NOTES
[facility]    Progress Note    Carlton Vo Patient Status:  Inpatient    1945 MRN [id number]   Location [facility] 3W/SW Attending Modesto Lau MD   Hosp Day # 12 PCP Randall Tsai MD, MD       Subjective:   Tiffany Wheeler opacities suggestive of edema. Overall no significant change since previous exam.  Suspect hilar mass or mucous plug. Xr Chest Ap/pa (1 View) (cpt=71010)    Result Date: 11/17/2017  CONCLUSION:  1.   Complete opacification of the left hemithorax, sugg

## 2017-11-19 NOTE — PROGRESS NOTES
[facility]    Progress Note    Delilia Profit Patient Status:  Inpatient    1945 MRN [id number]   Location [facility] 3W/SW Attending Darleen Bronson MD   Hosp Day # 15 PCP Yesenia Beltran MD, MD       Subjective:   Mireya Shirley right lung with scattered pulmonary opacities suggestive of edema. Overall no significant change since previous exam.  Suspect hilar mass or mucous plug.             Assessment and Plan:   Principal Problem:    Sepsis, due to unspecified organism [facility]  Ac

## 2017-11-20 NOTE — PROGRESS NOTES
[facility]    Progress Note    Delilia Profit Patient Status:  Inpatient    1945 MRN [id number]   Location [facility] 3W/SW Attending Darleen Bronson MD   Hosp Day # 14 PCP Yesenia Beltran MD, MD       Subjective:   Mireya Shirley pl effusio withpleurodex drained every other day,home on 11/22/17,ketruda infusion,home care         Randall Tsai MD, MD  11/20/2017

## 2017-11-20 NOTE — DIETARY NOTE
ADULT NUTRITION REASSESSMENT    Pt is at high nutrition risk. Pt meets malnutrition criteria.       CRITERIA FOR MALNUTRITION DIAGNOSIS:  Criteria for severe malnutrition diagnosis: chronic illness related to wt loss greater than 7.5% in 3 months, related weight gain now that feeling better. C/O nausea before 2 meals since here but did not take anti-nausea meds. Encouraged use of antinausea meds.  Educated and provided dietary handouts from diet manual on oncology nutrition and suggestions for weight gain wi ANTHROPOMETRICS:  HT:  5'11\"  WT: 62.8 kg (138 lb 6.4 oz) on 11/6/17.  74.1 kg(163 lb,  6.4 oz) today. Up 25 lb. However noted increase edema to BLE  11/20 165.6 lbs ( noted 2+ edema R & L LE)  BMI: Body mass index is 22.51 kg/m².   BMI CLASSIFICATION Reported as +2 edema  - Skin Integrity: intact, reddened, surgical wounds and RN documentation reviewed.   - Kb score 20      NUTRITION PRESCRIPTION:  Diet: general (pt is limiting sweets as discussed)  Oral Supplements: Ensure TID  Estimated Nutritiona

## 2017-11-20 NOTE — PLAN OF CARE
Problem: RISK FOR INFECTION - ADULT  Goal: Absence of fever/infection during anticipated neutropenic period  INTERVENTIONS  - Monitor WBC  - Administer growth factors as ordered  - Implement neutropenic guidelines   Outcome: Progressing  No fever.  Denies c

## 2017-11-20 NOTE — PLAN OF CARE
Home Oxygen Evaluation:    O2 sats while walking on 8L/trache mask with 30% FIO2- 91%. O2 sats at rest on 8L/trache mask with 30 % FIO2 - 96 %  O2 sats while walking on room air - 88%. O2 sats at rest on room air - 90%.

## 2017-11-21 PROBLEM — E43 SEVERE MALNUTRITION (HCC): Status: ACTIVE | Noted: 2017-01-01

## 2017-11-21 NOTE — PROGRESS NOTES
[facility] AND [facility]  Hematology/Oncology  Progress Note    [facility] Profit Patient Status:  Inpatient    1945 MRN [id number]   Location [facility] 2W/SW Attending Darleen Bronson MD   Hosp Day # 15 PCP Yesenia Beltran MD, MD     Subjective:  Joni Rocha Pneumothorax, unspecified type     Dyspnea, unspecified type     Severe malnutrition [facility])  Impression:  80 yo M with metastatic NSCLC complicated by left sided pleural effusion being treated with pembroluzimab immunotherapy admitted with atrial flutter wi 15 mg nightly as the pt reports desired weight gain since being initiated on this therapy as an outpatient  -- Pt has lowered albumin, being seen by nutritionist and given protein and milk supplements.    -- PT has prn compazine for nausea     4.) Leukocyto MD [facility]  [address] [address]  [facility]

## 2017-11-21 NOTE — CM/SW NOTE
Oxygen F2F completed, Kari Aly at [address] [place] [address] aware of probable discharge on 11/21/2017.       [facility], [place] [address]

## 2017-11-21 NOTE — PROGRESS NOTES
[facility]    Progress Note    Amilcar Lang Patient Status:  Inpatient    1945 MRN [id number]   Location [facility] 3W/SW Attending John Mar MD   Hosp Day # 15 PCP Brayan Dennis MD, MD       Subjective:   Hue Workman malnutrition ([facility]    persitent cough and sob,home am         Melody Terry, MD, MD  11/21/2017

## 2017-11-21 NOTE — PHYSICAL THERAPY NOTE
PHYSICAL THERAPY TREATMENT NOTE - INPATIENT    Room Number: 409/185-C       Presenting Problem: sepsis    Problem List  Principal Problem:    Sepsis, due to unspecified organism [facility])  Active Problems:    Non-small cell lung cancer ([facility]    Sepsis ([facility] Turning over in bed (including adjusting bedclothes, sheets and blankets)?: None   -   Sitting down on and standing up from a chair with arms (e.g., wheelchair, bedside commode, etc.): None   -   Moving from lying on back to sitting on the side of the bed? #6  Current Status

## 2017-11-22 NOTE — DISCHARGE SUMMARY
[facility]    PATIENT'S NAME: Negra Hernandez   ATTENDING PHYSICIAN: Laura Thurman.  Willard Luque MD   PATIENT ACCOUNT#:   [id number]    LOCATION:  [address] [place] [address] #:   [id number]       YOB: 1945  ADMISSION DATE:       11/06/2017 meropenem. PHYSICAL EXAMINATION:    VITAL SIGNS:  Temperature 96, pulse 159, respiratory rate 26, blood pressure 93/70. HEENT:  Unremarkable. Permanent tracheostomy was noted.   CHEST:  Symmetrical.  LUNGS:  Diminished breath sounds with increased dull

## 2017-11-22 NOTE — HOME CARE LIAISON
ORDERS RECEIVED TO [facility] UPON DISCHARGE. PATIENT IS ACTIVE WITH [facility]. MET WITH PATIENT AND WIFE, PRIOR TO DISCHARGE, WHO VERBALIZED AGREEMENT WITH CONTINUING SERVICES.

## 2017-11-22 NOTE — PROGRESS NOTES
[facility]    Progress Note    Sergei Scale Patient Status:  Inpatient    1945 MRN [id number]   Location [facility] 3W/SW Attending Arlene Samayoa MD   Hosp Day # 16 PCP Melody Terry MD, MD       Subjective:   Yessenia Madrigal ([address]    Felt better, less sob and cough,home today         Jimmie Fernández MD, MD  11/22/2017

## 2017-11-24 NOTE — PROGRESS NOTES
Post Chemo Documentation    Patient is here for treatment today, Cycle 3, Day 1 Keytruda & Zometa. Arrives in wheelchair with spouse      Accompanied by Family member - spouse                                 Modifications in dose or schedule: Yes.  Art Beltran treatment    Interventions:  monitor for signs/symptoms of infection  optimize nutrition status  chemotherapy teaching  educate regarding self care  encourage activity as tolerated  monitor effect of therapy  monitor lab values  promoted rest  provided gen

## 2017-11-27 PROBLEM — C34.90 NON-SMALL CELL LUNG CANCER, UNSPECIFIED LATERALITY (HCC): Status: RESOLVED | Noted: 2017-01-01 | Resolved: 2017-01-01

## 2017-11-29 PROBLEM — I48.91 ATRIAL FIBRILLATION (HCC): Status: ACTIVE | Noted: 2017-01-01

## 2017-11-29 PROBLEM — R06.00 DYSPNEA, UNSPECIFIED TYPE: Status: RESOLVED | Noted: 2017-01-01 | Resolved: 2017-01-01

## 2017-11-29 PROBLEM — Z79.899 ON AMIODARONE THERAPY: Chronic | Status: ACTIVE | Noted: 2017-01-01

## 2017-11-29 PROBLEM — D72.829 LEUKOCYTOSIS: Status: ACTIVE | Noted: 2017-01-01

## 2017-11-29 NOTE — PROGRESS NOTES
[address] Patient Status:  Outpatient    1945 MRN [id number]   Location Perry Lozano MD, MD Tahir Campbellalli Cesar is a 81year old male who presents to i for chills or fever  Respiratory:  negative for cough, hemoptysis and wheezing  Cardiovascular: negative for chest pain, exertional chest pressure/discomfort, near-syncope, orthopnea and palpitations  Gastrointestinal: negative for abdominal pain melena, n organomegaly  Extremities: extremities normal, atraumatic, no cyanosis, + 1 raghavendra ankle/pedal edema  Pulses: 2+ and symmetric  Neurologic: Grossly normal    Cardiographics:  EC17 SR  91 nsst changes , qtc 444 ms   Echocardiogram: 17 EF 55%, can education given. Patient/family receptive.     Assessment:  Paroxysmal atrial fib, converted to SR on amiodarone, anticoagulated on xarelto  Pneumonia with sepsis  Left pleural effusion s/p thoracentesis , with pleurx catheter  REDD- resolved  History of met toprol 150 mg daily  and xarelto 20 mg daily for stroke prevention. Not on amiodarone for the last week, was loaded with amiodarone over 2 weeks in-patient. Restart amiodarone at 400 mg daily, increase magnesium to 500 mg bid.  Continue diltiazem cd 120 mg

## 2017-11-29 NOTE — PATIENT INSTRUCTIONS
Begin amiodarone 400 mg tab , one tab daily with food    Increase magnesium 500 mg take one tablet twice daily    Continue all your same medications    Call if having any dizziness, lightheadedness, heart racing, palpitations, chest pain, shortness of allie

## 2017-12-07 NOTE — PROGRESS NOTES
[address] Patient Status:  Outpatient    1945 MRN [id number]   Location Quinn Bauer MD, MD Joe Reyes Gianna is a 81year old male who presents to i of Systems:  Constitutional: negative for chills or fever  Respiratory:  negative for  hemoptysis and wheezing  Cardiovascular: negative for chest pain, exertional chest pressure/discomfort, near-syncope, orthopnea and palpitations  Gastrointestinal: negat sounds normal; no masses,  no organomegaly  Extremities: extremities normal, atraumatic, no cyanosis, + 1 raghavendra ankle/pedal edema  Pulses: 2+ and symmetric  Neurologic: Grossly normal    Cardiographics:  EC-7-17 SR 84 bpm, nsst chgs, qtc 418 ms   EC this patient providing counseling, coordination of care and education given. Patient/family receptive.     Assessment:  Paroxysmal atrial fib, converted to SR on amiodarone   -remains in SR, qtc stable back on amiodarone 400 mg daily  -anticoagulated on xar daily, toprol 150 mg daily, amiodarone 400 mg daily and xarelto 20 mg daily for stroke prevention. Continue magnesium to 500 mg bid, asa 81 mg daily, ramipril 10 mg daily,  atorvastatin 40 mg daily and duonebs 3 times daily and other meds as prescribed.  Garry

## 2017-12-07 NOTE — PATIENT INSTRUCTIONS
Continue all your same medications    Call if having any dizziness, lightheadedness, heart racing, palpitations, chest pain, shortness of breath, coughing, swelling, weight gain, fever, chills or worsening symptoms.      Weigh yourself daily in the morning

## 2017-12-14 NOTE — PROGRESS NOTES
Pt brought back to lab via wheelchair. Pt requesting to leave needle in for treatment tomorrow. Port accessed using sterile technique without complication, blood return noted. Jimenez needle secured with steri strips and tegaderm. Labs drawn and sent.   L

## 2017-12-15 NOTE — PROGRESS NOTES
Post Chemo Documentation    Patient is here for treatment today, Cycle 4, Day 1 Keytruda & Zometa.       Arrives in wheelchair with spouse      Accompanied by Family member - spouse                                 Modifications in dose or schedule: none treatment    Interventions:  monitor for signs/symptoms of infection  optimize nutrition status  chemotherapy teaching  educate regarding self care  encourage activity as tolerated  monitor effect of therapy  monitor lab values  promoted rest  provided gen

## 2017-12-16 NOTE — PROGRESS NOTES
[facility] Center Progress Note    Patient Name: Jordi Arellano   YOB: 1945   Medical Record Number: [id number]   CSN: [id number]   Consulting Physician: Yolis Juarez MD  Referring Physician(s): Kyra Urbina MD  Date of Visit: 12/14/2017     Ch bronchial impaction, findings raise suspicion for endobronchial neoplasm with postobstructive left lower lobe pneumonia. There is a moderate left pleural effusion concerning for metastatic in nature.   There are multiple, at least 10, noncalcified right debby cycle of immunotherapy. Hospitalization in November for acute respiratory illness infection and received his second dose of pembrolizumab before Thanksgiving.  Hospit also complicated by requiring Xarelto anticoagulation atrial flutter and features of volu Social History:    Social History  Social History   Marital status:   Spouse name: N/A    Years of education: N/A  Number of children: N/A     Occupational History  retired       Social History Main Topics   Smoking status: Former Smoker  1.00 responses  HEENT: EOMs intact. Oropharynx is clear. +tracheostomy in place  Neck:  No palpable lymphadenopathy. Neck is supple. Lymphatics: There is no palpable lymphadenopathy throughout in the cervical, supraclavicular, axillary, or inguinal regions.   C effusion left sided w/ pleurx catheter in place    --Discussed with patient and his wife that his marker status returned with PDL-1 positive in 80% cells.   Informed the patient that he was negative for any EGFR, Ros-1, or ALK mutation status  --Recently un with the combination of palliative care with systemic treatment  --Provided patient and wife with resources at [facility] to help him deal with his illness as well as how it affects his family  --Well adjusted pt and wife to his condition    Emotional

## 2017-12-18 NOTE — TELEPHONE ENCOUNTER
Don Sanabria called to speak with Artie Powers about the dosage on a medication Remeron. She needs clarity on the dosage.  Please Advise thanks

## 2017-12-21 PROBLEM — R09.02 HYPOXIA: Status: ACTIVE | Noted: 2017-01-01

## 2017-12-21 PROBLEM — R06.00 DYSPNEA: Status: ACTIVE | Noted: 2017-01-01

## 2017-12-21 PROBLEM — R06.00 DYSPNEA, UNSPECIFIED TYPE: Status: ACTIVE | Noted: 2017-01-01

## 2017-12-21 NOTE — ED PROVIDER NOTES
Patient Seen in: [facility] AND [facility] Emergency Department    History   Patient presents with:  Dyspnea ROSY SOB (respiratory)    Stated Complaint:  Cough/secretions    HPI    82 yo M with PMH DM, HTN, HL nasal/sinus/layrngeal CA s/p reconstruction and s/p tra 0.5-2.5 (3) MG/3ML Inhalation Solution,  Take 3 mL by nebulization 3 (three) times daily. Ondansetron HCl (ZOFRAN) 4 mg tablet,  Take 0.5 tablets (2 mg total) by mouth 3 (three) times daily as needed.    DilTIAZem HCl ER Coated Beads 120 MG Oral Capsule S Device: None (Room air)    Current:/71   Pulse 78   Temp (!) 97 °F (36.1 °C) (Temporal)   Resp 26   Ht 180.3 cm (5' 11\")   Wt 68.5 kg   SpO2 96%   BMI 21.06 kg/m²         Physical Exam   Constitutional: No distress. HEENT: MMM.  Trach with thick/co DRAW GOLD   RAINBOW DRAW LAVENDER TALL (BNP)   SPUTUM CULTURE   BLOOD CULTURE   BLOOD CULTURE     Xr Chest Pa + Lat Chest (gnn=12970)    Result Date: 12/15/2017  PROCEDURE: XR CHEST PA + LAT CHEST (CPT=71020)  COMPARISON: [facility], CT MICHAEL contrast material.  Automated exposure control for dose reduction was used. Adjustment of the mA and/or kV was done based on the patient's size. Use of iterative reconstruction  technique for dose reduction was used.    FINDINGS:  CARDIAC: Coronary artery c BONES: Lytic metastasis right sixth rib unchanged. OTHER: Right Port-A-Cath with tip in RA SVC junction. Dictated by (CST): Sudarshan Quiñonez MD on 12/21/2017 at 16:36     Approved by (CST): Sudarshan Quiñonez MD on 12/21/2017 at 17:01          CONCLUSION:  1. CHEST (CPT=71250), 11/14/2017, 10:02. [facility], XR CHEST AP/PA (1 VIEW) (CPT=71010), 11/17/2017, 5:58. [facility], XR CHEST AP/PA (1 VIEW) (CPT=71010), 11/18/2017, 5:51.   [facility], XR CHEST PA + LAT MICHAEL dissemination of malignancy. 2. Multiple metastatic nodules of the right hemithorax are identified. 3. Complete opacification of the left hemithorax, in spite of the presence of a Pleurx catheter, with resultant rightward mediastinal shift.   4. Lesser in

## 2017-12-21 NOTE — ED NOTES
Pt wife states pt started SOB this morning, with Trach needing suctioned. Was not able to clear trach with coughing. Pt just finishing with Respiratory for suctioning. Breathing is better. Pt states he is feeling better. Wife states his color is better.

## 2017-12-22 NOTE — CM/SW NOTE
NI met with the pt. And his wife At bedside. The pt. Lives with his wife in a single family home. The pt. Reports being independent prior to admission with adls and ambulation. The pt.  Is current with [facility] for home O2, portable O2 and a nebulizer at home

## 2017-12-22 NOTE — PROGRESS NOTES
[facility]    Progress Note    Jose Luis Safe Patient Status:  Inpatient    1945 MRN [id number]   Location [facility] 4W/SW/SE Attending Bibi Yates MD   Hosp Day # 1 PCP Mariela Hernandez MD, MD       Subjective:   Mihaela Barajas abnormality in right lung may represent lymphangitic spread of tumor, edema, or less likely pneumonia. 4. Interval worsening of the left hemithorax with complete atelectasis and consolidation.  Left mainstem bronchus and central bronchi are completely obstr

## 2017-12-22 NOTE — H&P
[facility]    PATIENT'S NAME: Mike Jackelin   ATTENDING PHYSICIAN: Mario Wall.  Cassandra Teran MD   PATIENT ACCOUNT#:   [id number]    LOCATION:  [address] [place] [address] #:   [id number]       YOB: 1945  ADMISSION DATE:       12/21/20 GENERAL: Stronger. HEAD: No headache. EYES: No diplopia or blurred vision. EARS, NOSE, THROAT: He has impaired hearing. No rhinorrhea or epistaxis. No sore throat. NECK: No neck stiffness. RESPIRATORY: See History of Present Illness.   CARDIOVASCULAR portable. 4.   Consult Interventional Radiology. 5.   Hold Xarelto. 6.   Thoracentesis in the morning. Dictated By Dirk Luque MD  d: 12/22/2017 10:24:18  t: 12/22/2017 11:09:33  Job [id number]  RST/

## 2017-12-22 NOTE — PROGRESS NOTES
[facility] Dosing Service  Antibiotic Dosing    Vincent Mishra is a 81year old male for whom pharmacy is dosing meropenem for treatment of  Pneumonia. Other antibiotics: Vancomycin    Allergies: has No Known Allergies.     Vitals: BP (!) 80/46 (BP L

## 2017-12-23 NOTE — PLAN OF CARE
Altered Communication/Language Barrier    • Patient/Family is able to understand and participate in their care Progressing        Diabetes/Glucose Control    • Glucose maintained within prescribed range Progressing        DISCHARGE PLANNING    • Discharge

## 2017-12-23 NOTE — PROGRESS NOTES
[facility]    Progress Note    Jupiter Parcel Patient Status:  Inpatient    1945 MRN [id number]   Location [facility] 4W/SW/SE Attending Sheila Mcgarry MD   Hosp Day # 2 PCP Christin Jimenez MD, MD       Subjective:   Dane Cano metastases. 3. Interstitial abnormality in right lung may represent lymphangitic spread of tumor, edema, or less likely pneumonia. 4. Interval worsening of the left hemithorax with complete atelectasis and consolidation.  Left mainstem bronchus and central

## 2017-12-23 NOTE — PROCEDURES
[facility] 4W/SW/SE  Post Procedure Staging Note    Randy Felty Patient Status:  Inpatient    1945 MRN [id number]   Location [facility] 4W/SW/SE Attending Jayla Pak MD   Hosp Day # 2 PCP Fe Seymour MD, MD       Procedure Date

## 2017-12-23 NOTE — DIETARY NOTE
ADULT NUTRITION INITIAL ASSESSMENT    Pt is at high nutrition risk. Pt meets malnutrition criteria.       CRITERIA FOR MALNUTRITION DIAGNOSIS:  Criteria for severe malnutrition diagnosis: chronic illness related to wt loss greater than 10% in 6 months and WNL  IBW: 172 lbs        88 % IBW (actually, would be less due to edema)  Usual Body Wt: 175 lbs       86 % UBW (actually, would be less due to edema)    WEIGHT HISTORY:  Patient Weight(s) for the past 336 hrs:   Weight   12/21/17 1448 68.5 kg (151 lb) Yosef Rand DRAW LAVENDER   Collection Time: 12/21/17  3:27 PM   Result Value Ref Range   Hold Lavender Auto Resulted    -RAINBOW DRAW DARK GREEN   Collection Time: 12/21/17  3:27 PM   Result Value Ref Range   Hold Lt Green Auto Resulted    -RAINBOW DRAW LI Result Value Ref Range   POC Glucose  235 (H) 70 - 99   -POCT GLUCOSE   Collection Time: 12/22/17  5:09 PM   Result Value Ref Range   POC Glucose  292 (H) 70 - 99   -POCT GLUCOSE   Collection Time: 12/22/17  9:17 PM   Result Value Ref Range   POC Glucose and Nutrient Intake:      Monitor: adequacy of PO intake and tolerance of supplement intake.     - Anthropometric Measurement:      Monitor: wt and wt change     - Nutrition Goals:      allow wt loss due to fluid losses, true wt gain, PO and supplement grea

## 2017-12-23 NOTE — PLAN OF CARE
Altered Communication/Language Barrier    • Patient/Family is able to understand and participate in their care Progressing        Diabetes/Glucose Control    • Glucose maintained within prescribed range Progressing        PAIN - ADULT    • Verbalizes/displ

## 2017-12-24 NOTE — PROGRESS NOTES
[facility]    Progress Note    Jordi Arellano Patient Status:  Inpatient    1945 MRN [id number]   Location [facility] 4W/SW/SE Attending Rick Sánchez MD   Hosp Day # 3 PCP Fredrick Wagner MD, MD       Subjective:   Chani Morrow opacifications in the right lung and complete opacification of the left chest with some minimal residual aeration in the left apex. 3. Right central line unchanged. Left Pleurx catheter again seen.          Us Thoracentesis Guided Right (cpt=32555)    Resul

## 2017-12-25 NOTE — PROGRESS NOTES
[facility]    Progress Note    Louis Carmona Patient Status:  Inpatient    1945 MRN [id number]   Location [facility] ENDOSCOPY LAB SUITES Attending Erik Medel MD   Hosp Day # 4 PCP Navin Bedoya MD, MD       Subjective: opacifications in the right lung and complete opacification of the left chest with some minimal residual aeration in the left apex. 3. Right central line unchanged. Left Pleurx catheter again seen.          Us Thoracentesis Guided Right (cpt=32555)    Resul

## 2017-12-26 NOTE — PROGRESS NOTES
[facility]    Progress Note    Myriam Goods Patient Status:  Inpatient    1945 MRN [id number]   Location [facility] 4W/SW/SE Attending Ruby Sun MD   Hosp Day # 5 PCP Filipe Villalobos MD, MD       Subjective:   Jeronimo Doe MD  12/26/2017

## 2017-12-26 NOTE — CM/SW NOTE
+BPCI Readmission   Patient is a [address] readmission previously enrolled on 11/3/2017 under  with 36 days left in the BPCI episode which will end on 1/31/2018.

## 2017-12-26 NOTE — CONSULTS
Oncology Consult Note    Patient Name: Aurora Fink   YOB: 1945   Medical Record Number: [id number]   CSN: [id number]   Consulting Physician: Jose Nguyen MD  Referring Physician(s): Renee Yepez MD  Date of Consultation: 12/26/2017     TRUPTI and tracheostomy for laryngeal                cancer recurrence  03/2011: OTHER      Comment: Total rhinectomy and flap reconstruction.   05/10/2002: OTHER      Comment: Resection of left true vocal cord nodule  2007: RADIATION THERAPY TREATMENT, ONC (EXT) positives and negatives noted in the the HPI.      Vital Signs:  BP 97/52 (BP Location: Right arm)   Pulse 106   Temp 97.7 °F (36.5 °C) (Oral)   Resp 18   Ht 1.803 m (5' 11\")   Wt 68.5 kg (151 lb)   SpO2 93%   BMI 21.06 kg/m²     Physical Examination:    G Interstitial abnormality in right lung may represent lymphangitic spread of tumor, edema, or less likely pneumonia. 4. Interval worsening of the left hemithorax with complete atelectasis and consolidation.  Left mainstem bronchus and central bronchi are co range, will continue to monitor  --Current Hgb value of  9 g/dL may be due to recent procedures  --Would recommend transfusion of pRBC for hgb values <7 g/dL    5.) REDD    --Possibly due to dehydration; on continuous IVF    6.) Constipation     --providing

## 2017-12-26 NOTE — OPERATIVE REPORT
[facility]    PATIENT'S NAME: Gary Joaquin   ATTENDING PHYSICIAN: Opal Melo. Royal Riccardo MD   OPERATING PHYSICIAN: Opal Melo.  Royal Riccardo MD   PATIENT ACCOUNT#:   [id number]    LOCATION:  [address] [place] [address] RECORD #:   [id number]       DATE OF BIRTH:  [birth year]

## 2017-12-26 NOTE — WOUND PROGRESS NOTE
WOUND CARE NOTE      PLAN   Recommendations:  Dietary consult for recommendations for nutrition to optimize wound healing  Turn schedules  Sit on a pillow in the chair  Heels elevated using pillows, heel wedge or heel boots to offload heels  To prevent s 12/14/2017

## 2017-12-26 NOTE — PROCEDURES
[facility] 4W/SW/SE  Post Procedure Staging Note    Jordi Arellano Patient Status:  Inpatient    1945 MRN [id number]   Location [facility] 4W/SW/SE Attending Rick Sánchez MD   Hosp Day # 5 PCP Fredrick Wagner MD, MD       Procedure Date

## 2017-12-27 NOTE — PROGRESS NOTES
[facility]  Hematology/Oncology  Progress Note    Bocharly Mishra Patient Status:  Inpatient    1945 MRN [id number]   Location [facility] 4W/SW/SE Attending Lashawn Thomas MD   Hosp Day # 6 PCP Myriam Carlson MD, MD     Subjective:  Antwan Kirkpatrick acquired pneumonia, unspecified laterality     COPD exacerbation ([facility] [address]     Weight loss     Other acquired pure red cell aplasias (HCC)     Thrombocytosis (HCC)     Sepsis (HCC)     Sepsis, due to unspecified organism [facility])     Atrial flutter with rapid marybeth improved     3.) Leukocytosis      --Suspect infection related as monocytes noted; on vanc+meropenem     4.) Anemia     --Hgb is typically in the 11-12 g/dL range, will continue to monitor  --Current Hgb value of  9 g/dL may be due to recent procedures  --

## 2017-12-27 NOTE — DIETARY NOTE
ADULT NUTRITION Re  ASSESSMENT    Pt is at high nutrition risk. Pt meets malnutrition criteria.       CRITERIA FOR MALNUTRITION DIAGNOSIS:  Criteria for severe malnutrition diagnosis: chronic illness related to wt loss greater than 10% in 6 months and ener transfer of nutrition care to new setting or provider:   Anticipate d/c will be to pt's home.     ADMITTING DIAGNOSIS: Malignant Pleural Effusion    PERTINENT PAST MEDICAL HISTORY: H/O laryngectomy for cancer,  DM, HTN, Hyperlipidemia, Cancer of nasal cavit Oral Nightly   • ramipril  10 mg Oral Nightly   • Metoprolol Succinate ER  150 mg Oral Nightly   • Pioglitazone HCl  45 mg Oral Daily   • vancomycin  1,250 mg Intravenous Q24H   • meropenem  500 mg Intravenous Q12H   • Insulin Aspart Pen  1-5 Units Subcuta

## 2017-12-27 NOTE — CM/SW NOTE
12/27- MD orders received in regards to discharge planning [facility]-Case Management previously noted that the Patient is current with [facility].  This Writer informed Andreea ([id number]) [facility] that the resumption orders are in.     -EVAN Farooq Ext

## 2017-12-27 NOTE — PHYSICAL THERAPY NOTE
PHYSICAL THERAPY EVALUATION - INPATIENT     Room Number: 451/451-A  Evaluation Date: 12/27/2017  Type of Evaluation: Initial  Physician Order: PT Eval and Treat    Presenting Problem: dyspnea, worsening secretions, generalized weakness; last chemo on F reports 15 is too strong and cylinder cannot adjust to 12.  SaO2 remains at 95-97% throughout session    Wife report plf last week was patient able to navigate up/down 9 steps with her assistance, he was able to walk in home with RW about 200' with supervis Hypoxia      Past Medical History  Past Medical History:   Diagnosis Date   • Arrhythmia     atrial fibrillation   • Cancer of nasal cavity and sinus (HCC) 03/31/2011    Well-differentiated infiltrating squamous cell carcinoma.   The patient underwent defin be moving to upper level. He needs assistance in the bathroom and wife sets him up to brush his teeth and clean up, she showers him twice a week in the upstairs bathroom, they have a shower chair and grab bar with a step in tub.  He was able to get in/out o (G-Code): CK    FUNCTIONAL ABILITY STATUS  Gait Assessment   Gait Assistance: Minimum assistance (second person to manage lines)  Distance (ft): 10', 5'  Assistive Device: Rolling walker (10L O2 via trach)  Pattern: Shuffle  Stoop/Curb Assistance: Not test

## 2017-12-27 NOTE — PROGRESS NOTES
[facility]    Progress Note    Foster Khannawright Patient Status:  Inpatient    1945 MRN [id number]   Location [facility] 4W/SW/SE Attending Pancho Mendoza MD   Hosp Day # 6 PCP Pili Falk MD, MD       Subjective:   Stuart Press Pleural effusion    Non-small cell lung cancer ([facility] [address]    Dyspnea    Hypoxia       Weak,hypoxemic,patient had not responded to immunotherapy,patient is DNR,will consider palliative care      Cristhian Hernandez MD, MD  12/27/2017

## 2017-12-27 NOTE — PROGRESS NOTES
Antibiotic Stewardship Note:  Date:  12/27/2017  Drug/Duration:   Meropenem started on 12/21; now D#7  Vancomycin: Started on 12/21, now D#7,   Indication: Pneumonia (HCAP); NSCLC  Progression of right lung pulmonary metastases.  Interstitial abnormality in

## 2017-12-28 PROBLEM — E46 MALNUTRITION (HCC): Status: ACTIVE | Noted: 2017-01-01

## 2017-12-28 NOTE — HOSPICE RN NOTE
[facility] met with pt and family.  Discussed  with Dr. Sandrine Haynes  Will discuss uses for Morphine for resp distress as well as pain and reevaluate pts needs tomorrow

## 2017-12-28 NOTE — SIGNIFICANT EVENT
Rapid response called. Tele had contacted RN that pt was buck in the 40's. They arrived to room to find him unresponsive with sats in the 50's. Trach mask to the side. Stage IV lung CA admitted w/ pleural effusion(s) and hypoxia. Normally conversive.

## 2017-12-28 NOTE — CM/SW NOTE
12/28-MD orders received in regards to hospice. This Writer made a referral to Shirley Harman ([phone]) and sent out referral documents. This Writer informed the Patient's RN of the above.     -[facility] [id number]

## 2017-12-28 NOTE — PROGRESS NOTES
[facility]    [facility]    Jorid Arellano Patient Status:  Inpatient    1945 MRN [id number]   Location [facility] 4W/SW/SE Attending Rick Sánchez MD   Hosp Day # 7 PCP Fredrick Wagner MD, MD       Subjective:   Chani Morrow Small right pleural effusion, also increased in size. Unchanged complete opacification of left hemithorax. Moderate pulmonary edema right lung, unchanged.   Preliminary report was submitted by the [facility] teleradiologist and there are no significant discr

## 2017-12-28 NOTE — SIGNIFICANT EVENT
RRT    *See RRT Documentation Record*    Reason the RRT was called: Notified by tele of lowering HR, patient found unresponsive/ trach collar at side of patient   Assessment of patient leading up to RRT:Patient was unresponsive, O2 sat down to 50's and HR

## 2017-12-29 PROBLEM — C32.9 LARYNGEAL CANCER (HCC): Status: ACTIVE | Noted: 2017-01-01

## 2017-12-29 PROBLEM — E43 SEVERE PROTEIN-CALORIE MALNUTRITION (HCC): Status: ACTIVE | Noted: 2017-01-01

## 2017-12-29 NOTE — PROGRESS NOTES
[facility]    Progress Note    Mar Foley Patient Status:  Inpatient    1945 MRN [id number]   Location [facility] 4W/SW/SE Attending Sylvia Robertson MD   Hosp Day # 8 PCP Venkata Sweeney MD, MD       Subjective:   Adam Archibald pneumonia. Small right pleural effusion, also increased in size. Unchanged complete opacification of left hemithorax. Moderate pulmonary edema right lung, unchanged.   Preliminary report was submitted by the [facility] teleradiologist and there are no signif

## 2017-12-29 NOTE — TELEPHONE ENCOUNTER
Confirmed that he was placed in inpatient hospice and that Ygnacio Closs had already spoken with Dr Jordan Goncalves

## 2017-12-29 NOTE — PROGRESS NOTES
[facility]  Hematology/Oncology  Progress Note    Joe Must Patient Status:  Inpatient    1945 MRN [id number]   Location [facility] 4W/SW/SE Attending Jack Zacarias MD   Hosp Day # 7 PCP Yeyo Gonzalez MD, MD     Subjective:  Breann Cavanaugh left lung, unspecified part of lung     Diabetes 1.5, managed as type 2 ([facility]     [facility]     Bone metastasis [facility]     Community acquired pneumonia, unspecified laterality     COPD exacerbation ([facility]     Weight loss     Other acqu us to take part in the care of this patient. We will sign off now as pt is being transitioned to hospice care.     Erin Ramirez MD  [facility]  [address] [place] [address].  [address] [place] [address]

## 2017-12-29 NOTE — TELEPHONE ENCOUNTER
Rema  called for Dr. Edwin Aranda. Contact# [phone] She stated she has questions concerning the patient Lavinia Morejon.  Please Advise  Thanks

## 2017-12-29 NOTE — PROGRESS NOTES
[facility]    CDI Prediction Tool Protocol (Vancomycin Prophylaxis Deferred)      This patient is currently at high risk for developing CDI due to his/her score being >/= 13 points.   The current score is: 15    Score Breakdown:  High risk antibi

## 2017-12-29 NOTE — PROGRESS NOTES
Pt  at [address]. Resusitation not attempted as pt was DNR.     Time of Death 18    Family Notified yes  Name and Rachelle Killer, wife    MD Dr. Hari Reynolds of [facility] [employer] Notified [employer] QDO#[id number]     contacted if applicable no

## 2018-01-05 ENCOUNTER — APPOINTMENT (OUTPATIENT)
Dept: HEMATOLOGY/ONCOLOGY | Facility: HOSPITAL | Age: 73
End: 2018-01-05
Attending: INTERNAL MEDICINE
Payer: MEDICARE

## 2018-01-05 ENCOUNTER — APPOINTMENT (OUTPATIENT)
Dept: HEMATOLOGY/ONCOLOGY | Facility: HOSPITAL | Age: 73
End: 2018-01-05
Attending: PHYSICIAN ASSISTANT
Payer: MEDICARE

## 2018-01-12 NOTE — DISCHARGE SUMMARY
[facility]    PATIENT'S NAME: Minoo Stevenson   ATTENDING PHYSICIAN: Lindsey Nur MD   PATIENT ACCOUNT#:   [id number]    LOCATION:  [address] [place] [address] #:   [id number]       YOB: 1945  ADMISSION DATE:       12/21/20 started. PHYSICAL EXAMINATION:    VITAL SIGNS:  Temperature 98, pulse 71, respirations 24, blood pressure 107/68. HEENT:  Unremarkable. Prosthesis of the nose was noted. Permanent tracheostomy was noted.   CHEST:  Symmetrical.  LUNGS:  Reveal normal

## 2018-01-26 ENCOUNTER — APPOINTMENT (OUTPATIENT)
Dept: HEMATOLOGY/ONCOLOGY | Facility: HOSPITAL | Age: 73
End: 2018-01-26
Attending: INTERNAL MEDICINE
Payer: MEDICARE

## 2018-01-26 ENCOUNTER — APPOINTMENT (OUTPATIENT)
Dept: HEMATOLOGY/ONCOLOGY | Facility: HOSPITAL | Age: 73
End: 2018-01-26
Attending: PHYSICIAN ASSISTANT
Payer: MEDICARE

## 2018-02-16 ENCOUNTER — APPOINTMENT (OUTPATIENT)
Dept: HEMATOLOGY/ONCOLOGY | Facility: HOSPITAL | Age: 73
End: 2018-02-16
Attending: INTERNAL MEDICINE
Payer: MEDICARE

## 2019-03-01 VITALS
HEART RATE: 68 BPM | HEIGHT: 71 IN | WEIGHT: 175 LBS | DIASTOLIC BLOOD PRESSURE: 60 MMHG | SYSTOLIC BLOOD PRESSURE: 116 MMHG | BODY MASS INDEX: 24.5 KG/M2

## 2020-12-31 NOTE — PROGRESS NOTES
[facility] dosing service    Initial Pharmacokinetic Consult for Vancomycin Dosing     Blanquita Lovell is a 81year old male admitted on 12/21 who is being treated for pneumonia.   Pharmacy has been asked to dose Vancomycin by Dr. Stella Cedillo    He has No K Per ems pt picked up at home mother just passed away yesterday and drank a bottle of vlad pt very anxious and moving arms around fanning away upon arrival Khmer speaking    and efficacy. Pharmacy will continue to follow him. We appreciate the opportunity to assist in his care.     Taqueria Caballero, PharmD  12/21/2017  9:24 PM  [facility] Extension: [phone]

## 2023-01-29 NOTE — PLAN OF CARE
RESPIRATORY - ADULT    • Achieves optimal ventilation and oxygenation Not Progressing          Altered Communication/Language Barrier    • Patient/Family is able to understand and participate in their care Progressing        Diabetes/Glucose Control    • G 29-Jan-2023 11:02

## 2024-03-04 NOTE — PLAN OF CARE
Altered Communication/Language Barrier    • Patient/Family is able to understand and participate in their care Progressing        PAIN - ADULT    • Verbalizes/displays adequate comfort level or patient's stated pain goal Progressing        Patient/Family G 75

## (undated) DEVICE — SINGLE USE SUCTION VALVE MAJ-209: Brand: SINGLE USE SUCTION VALVE (STERILE)

## (undated) DEVICE — ENDOSCOPY PACK UPPER: Brand: MEDLINE INDUSTRIES, INC.

## (undated) DEVICE — 35 ML SYRINGE REGULAR TIP: Brand: MONOJECT

## (undated) DEVICE — SINGLE USE BIOPSY VALVE MAJ-210: Brand: SINGLE USE BIOPSY VALVE (STERILE)

## (undated) DEVICE — CONTAINER CLIKSEAL PP 4OZ BLU

## (undated) DEVICE — TRAP MCS 40ML 5IN PLS SCR CAP

## (undated) DEVICE — SYRINGE 10ML SLIP TIP

## (undated) DEVICE — FORCEP RADIAL JAW 4

## (undated) DEVICE — WIREGUIDED CYTOLOGY BRUSH: Brand: RX CYTOLOGY BRUSH

## (undated) NOTE — IP AVS SNAPSHOT
[facility]            (For Outpatient Use Only) Initial Admit Date: 8/28/2017   Inpt/Obs Admit Date: Inpt: 8/28/17 / Obs: N/A   Discharge Date:    Mich Moul[place]:  [de-identified]   MRN: [de-identified]   CSN: [id number]        [facility] Subscriber Name:  Ermelinda Begum :    Subscriber ID:  Pt Rel to Subscriber:    Hospital Account Financial Class: Medicare    September 3, 2017

## (undated) NOTE — LETTER
[address], [place]  Authorization for Invasive Procedures  1. I hereby authorize Dr Jesse Norman , my physician and whomever may be designated as the doctor's assistant, to perform the following operation and/or procedure:  Ins performed for the purposes of advancing medicine, science, and/or education, provided my identity is not revealed. If the procedure has been videotaped, the physician/surgeon will obtain the original videotape.  The hospital will not be responsible for stor My signature below affirms that prior to the time of the procedure, I have explained to the patient and/or his legal representative, the risks and benefits involved in the proposed treatment and any reasonable alternative to the proposed treatment.  I have

## (undated) NOTE — LETTER
[address] [place] [address]  Authorization for Invasive Procedures  1.  I hereby authorize Dr. Danisha Saleh and/or Dr. Irena Osorio** , my physician and whomever may be designated as the doctor's assistant, to perform the following operation and/or performed for the purposes of advancing medicine, science, and/or education, provided my identity is not revealed. If the procedure has been videotaped, the physician/surgeon will obtain the original videotape.  The hospital will not be responsible for stor My signature below affirms that prior to the time of the procedure, I have explained to the patient and/or his legal representative, the risks and benefits involved in the proposed treatment and any reasonable alternative to the proposed treatment.  I have

## (undated) NOTE — IP AVS SNAPSHOT
Patient Demographics     Address  [address] [place] [address] N Phone  [phone] [facility])      Emergency Contact(s)     Name Lizzie Vaca [place] [address] Spouse [phone]     Paloma Real [phone] Commonly known as:  PRILOSEC      Take 20 mg by mouth every morning. ramipril 10 MG Caps  Commonly known as:  ALTACE      Take 10 mg by mouth daily. VICTOZA 18 MG/3ML Sopn  Generic drug:  Liraglutide      Inject 18 mg into the skin. [id number] magnesium oxide (MAG-OX) tab 400 mg 09/03/17 0613 Given      [id number] pioglitazone (ACTOS) tab 09/02/17 2011 Given      [id number] ramipril (ALTACE) cap 09/03/17 0838 Given      [id number] sodium chloride 0.9 % infusion 09/02/17 1600 New Bag Ordering provider:  Josef Golden MD  09/02/17 8736 Resulting lab:  [facility] LAB    Specimen Information    Type Source Collected On   Blood — 09/03/17 3612          Components    Component Value Reference Range Flag Lab   Glucose 133 70 - 99 Blood Culture FREQ X 2 [[id number]] Collected:  08/28/17 2123    Order Status:  Completed Lab Status:  Final result Updated:  09/02/17 2300    Specimen:  Blood from Blood,peripheral      Blood Culture Result No Growth 5 Days    Body Fluid Cult Aerobic and Specimen: Body fluid, unspecified from Bronchial washing     Narrative: The following orders were created for panel order AFB CULTURE AND SMEAR.   Procedure                               Abnormality         Status                     --------- AFB CULTURE AND SMEAR Once [[id number]] Collected:  08/29/17 8867    Order Status:  Sent Lab Status: In process Updated:  08/29/17 1422    Specimen:  Other from Pleura left     Narrative:        The following orders were created for panel order AFB CULTURE Order Status:  Sent Lab Status:   In process Updated:  08/29/17 1009    Specimen:  Other from Pleura left          H&P - H&P Note      H&P filed by Giovany Cardoza MD at 8/30/2017  9:41 AM / Draft: Not Electronically Signed  Version 1 of 1    Au REVIEW OF SYSTEMS:  Denies any chest pain. Denies any abdominal pain, nausea, vomiting. Denies any headache. Denies any syncope. PHYSICAL EXAMINATION:    GENERAL:  Awake and alert. VITAL SIGNS:  Afebrile. Blood pressure 127/70, pulse rate 94. [facility]    Report of Hematology/Oncology Consultation    [facility] Patient Status:  Inpatient    1945 MRN [id number]   [facility] Attending Nighat Clare*   Date of admission 2017  7:32 PM   GEO WOLF neoplasm with postobstructive left lower lobe pneumonia. There is a moderate left pleural effusion concerning for metastatic in nature. There are multiple, at least 10, noncalcified right lung nodules measuring up to 1.3 cm.   These were concerning for me • Cancer of nasal cavity and sinus ([facility] 03/31/2011    Well-differentiated infiltrating squamous cell carcinoma.   The patient underwent definitive surgery at [facility] with reconstruction and adjuvant RT   • Diabetes [facility])    • Essential hypertension • vancomycin  125 mg Oral BID   • piperacillin tazobactam (ZOSYN) D5W 3.375g/100ml IVPB  3.375 g Intravenous Q8H   • vancomycin  15 mg/kg Intravenous Q12H   • Metoprolol Succinate ER  150 mg Oral Nightly   • Pioglitazone HCl  45 mg Oral Nightly   • aspirin Hematological: Negative for adenopathy. Does not bruise/bleed easily. Psychiatric/Behavioral: Negative for sleep disturbance. The patient is nervous/anxious.         Physical Exam:   /70 (BP Location: Right arm)   Pulse 97   Temp 98.3 °F (36.8 °C) ( Sodium 140 136 - 144 mmol/L   Potassium 4.2 3.3 - 5.1 mmol/L   Chloride 107 95 - 110 mmol/L   CO2 25 22 - 32 mmol/L   BUN 9 8 - 20 mg/dL   Creatinine 1.03 0.50 - 1.50 mg/dL   Calcium, Total 9.1 8.5 - 10.5 mg/dL   BUN/CREA Ratio 8.7 (L) 10.0 - 20.0   Anion - Clinical stage from 9/1/2017: Stage IV (T4, N3, M1a) - Signed by Luis Jones MD on 9/1/2017    Discussed with the patient his diagnosis of non-small cell, adenocarcinoma of the lung. Discussed staging as above, that he has stage IV cancer.   [facility] [id number] [facility], [address] [place] [address]  [phone]      09/01/17      Electronically signed by Shaun Calvo MD on 9/1/2017  9:24 PM   Attr

## (undated) NOTE — LETTER
Printed: 2017    Patient Name: Randy Felty  : 1945   Medical Record #: [id number]    Consent to Brandt Frey, understand that I have been diagnosed with NSCLC-adenocarcinoma (stage IV).     I understand th contact my oncologist, Marilyn Woods, or my Cancer Care Team at any time if I have questions, by calling [phone]. Additional written information will be given to me prior to start of therapy.     Additionally, I will receive a copy of this consent fo